# Patient Record
Sex: FEMALE | Race: WHITE | NOT HISPANIC OR LATINO | Employment: OTHER | URBAN - METROPOLITAN AREA
[De-identification: names, ages, dates, MRNs, and addresses within clinical notes are randomized per-mention and may not be internally consistent; named-entity substitution may affect disease eponyms.]

---

## 2017-01-03 ENCOUNTER — CONSULT (OUTPATIENT)
Dept: ONCOLOGY | Facility: CLINIC | Age: 57
End: 2017-01-03

## 2017-01-03 ENCOUNTER — LAB (OUTPATIENT)
Dept: LAB | Facility: HOSPITAL | Age: 57
End: 2017-01-03

## 2017-01-03 VITALS
DIASTOLIC BLOOD PRESSURE: 76 MMHG | SYSTOLIC BLOOD PRESSURE: 108 MMHG | OXYGEN SATURATION: 98 % | WEIGHT: 158.9 LBS | HEART RATE: 87 BPM | BODY MASS INDEX: 25.54 KG/M2 | TEMPERATURE: 98.6 F | RESPIRATION RATE: 14 BRPM | HEIGHT: 66 IN

## 2017-01-03 DIAGNOSIS — Z85.3 HX OF BREAST CANCER: Primary | ICD-10-CM

## 2017-01-03 DIAGNOSIS — Z85.3 HISTORY OF LEFT BREAST CANCER: Primary | ICD-10-CM

## 2017-01-03 LAB
BASOPHILS # BLD AUTO: 0.02 10*3/MM3 (ref 0–0.2)
BASOPHILS NFR BLD AUTO: 0.2 % (ref 0–2)
DEPRECATED RDW RBC AUTO: 44 FL (ref 37–54)
EOSINOPHIL # BLD AUTO: 0.02 10*3/MM3 (ref 0.1–0.3)
EOSINOPHIL NFR BLD AUTO: 0.2 % (ref 0–4)
ERYTHROCYTE [DISTWIDTH] IN BLOOD BY AUTOMATED COUNT: 12.6 % (ref 11.5–14.5)
HCT VFR BLD AUTO: 48.1 % (ref 37–47)
HGB BLD-MCNC: 16.3 G/DL (ref 12–16)
HOLD SPECIMEN: NORMAL
IMM GRANULOCYTES # BLD: 0.03 10*3/MM3 (ref 0–0.03)
IMM GRANULOCYTES NFR BLD: 0.3 % (ref 0–0.5)
LYMPHOCYTES # BLD AUTO: 1.89 10*3/MM3 (ref 0.6–4.8)
LYMPHOCYTES NFR BLD AUTO: 20.5 % (ref 20–45)
MCH RBC QN AUTO: 32 PG (ref 27–31)
MCHC RBC AUTO-ENTMCNC: 33.9 G/DL (ref 31–37)
MCV RBC AUTO: 94.5 FL (ref 81–99)
MONOCYTES # BLD AUTO: 0.4 10*3/MM3 (ref 0–1)
MONOCYTES NFR BLD AUTO: 4.3 % (ref 3–8)
NEUTROPHILS # BLD AUTO: 6.88 10*3/MM3 (ref 1.5–8.3)
NEUTROPHILS NFR BLD AUTO: 74.5 % (ref 45–70)
NRBC BLD MANUAL-RTO: 0 /100 WBC (ref 0–0)
PLATELET # BLD AUTO: 240 10*3/MM3 (ref 140–500)
PMV BLD AUTO: 10.2 FL (ref 7.4–10.4)
RBC # BLD AUTO: 5.09 10*6/MM3 (ref 4.2–5.4)
WBC NRBC COR # BLD: 9.24 10*3/MM3 (ref 4.8–10.8)
WHOLE BLOOD HOLD SPECIMEN: NORMAL
WHOLE BLOOD HOLD SPECIMEN: NORMAL

## 2017-01-03 PROCEDURE — 85025 COMPLETE CBC W/AUTO DIFF WBC: CPT | Performed by: INTERNAL MEDICINE

## 2017-01-03 PROCEDURE — 99244 OFF/OP CNSLTJ NEW/EST MOD 40: CPT | Performed by: INTERNAL MEDICINE

## 2017-01-03 PROCEDURE — 36415 COLL VENOUS BLD VENIPUNCTURE: CPT

## 2017-01-03 RX ORDER — CLONAZEPAM 2 MG/1
3 TABLET ORAL NIGHTLY
COMMUNITY

## 2017-01-03 RX ORDER — PALIPERIDONE 6 MG/1
TABLET, EXTENDED RELEASE ORAL
Refills: 0 | Status: ON HOLD | COMMUNITY
Start: 2016-12-13 | End: 2019-02-15

## 2017-01-03 RX ORDER — TOPIRAMATE 50 MG/1
50 TABLET, FILM COATED ORAL DAILY
Refills: 0 | COMMUNITY
Start: 2016-12-13 | End: 2019-02-16 | Stop reason: HOSPADM

## 2017-01-03 RX ORDER — LEVETIRACETAM 1000 MG/1
TABLET ORAL
COMMUNITY
Start: 2016-09-21 | End: 2019-02-16 | Stop reason: HOSPADM

## 2017-01-03 RX ORDER — PRAZOSIN HYDROCHLORIDE 1 MG/1
CAPSULE ORAL
Refills: 0 | Status: ON HOLD | COMMUNITY
Start: 2016-12-22 | End: 2019-02-15

## 2017-01-03 RX ORDER — VENLAFAXINE HYDROCHLORIDE 150 MG/1
CAPSULE, EXTENDED RELEASE ORAL
Refills: 0 | COMMUNITY
Start: 2016-12-13 | End: 2017-01-03

## 2017-01-03 RX ORDER — DOXYCYCLINE HYCLATE 100 MG/1
CAPSULE ORAL
Refills: 0 | COMMUNITY
Start: 2016-12-19 | End: 2018-08-27

## 2017-01-03 RX ORDER — VENLAFAXINE HYDROCHLORIDE 150 MG/1
150 CAPSULE, EXTENDED RELEASE ORAL DAILY
COMMUNITY

## 2017-01-03 RX ORDER — BUSPIRONE HYDROCHLORIDE 10 MG/1
20 TABLET ORAL 2 TIMES DAILY
Status: ON HOLD | COMMUNITY
End: 2019-02-15

## 2017-01-03 RX ORDER — FOLIC ACID 1 MG/1
1 TABLET ORAL
COMMUNITY

## 2017-01-03 RX ORDER — OMEPRAZOLE 20 MG/1
CAPSULE, DELAYED RELEASE ORAL
COMMUNITY
Start: 2012-11-26

## 2017-01-03 RX ORDER — ATORVASTATIN CALCIUM 40 MG/1
40 TABLET, FILM COATED ORAL
COMMUNITY
Start: 2014-10-07

## 2017-01-03 RX ORDER — HALOPERIDOL 5 MG/1
TABLET ORAL
Refills: 0 | COMMUNITY
Start: 2016-12-13
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
## 2017-01-03 NOTE — MR AVS SNAPSHOT
Geeta Cristobal   1/3/2017 1:20 PM   Consult    Dept Phone:  [phone]   Encounter #:  [id number]    Provider:  Lv Arboleda MD   Department:  [facility] GROUP:CONSULTANTS IN BLOOD DISORDERS AND CANCER                Your Full Care Plan              Today's Medication Changes          These changes are accurate as of: 1/3/17  1:48 PM.  If you have any questions, ask your nurse or doctor.               Medication(s)that have changed:     venlafaxine  MG 24 hr capsule   Commonly known as:  EFFEXOR-XR   Take 150 mg by mouth Daily.   What changed:  Another medication with the same name was removed. Continue taking this medication, and follow the directions you see here.   Changed by:  Lv Arboleda MD                  Your Updated Medication List          This list is accurate as of: 1/3/17  1:48 PM.  Always use your most recent med list.                AMPHETAMINE SALT COMBO PO       atorvastatin 40 MG tablet   Commonly known as:  LIPITOR       busPIRone 10 MG tablet   Commonly known as:  BUSPAR       clonazePAM 2 MG tablet   Commonly known as:  KlonoPIN       D3 ADULT PO       doxycycline 100 MG capsule   Commonly known as:  VIBRAMYCIN       folic acid 1 MG tablet   Commonly known as:  FOLVITE       haloperidol 2 MG tablet   Commonly known as:  HALDOL       levETIRAcetam 1000 MG tablet   Commonly known as:  KEPPRA       MULTI-VITAMIN tablet       omeprazole 20 MG capsule   Commonly known as:  priLOSEC       paliperidone 6 MG 24 hr tablet   Commonly known as:  INVEGA       prazosin 1 MG capsule   Commonly known as:  MINIPRESS       topiramate 50 MG tablet   Commonly known as:  TOPAMAX       venlafaxine  MG 24 hr capsule   Commonly known as:  EFFEXOR-XR               You Were Diagnosed With        Codes Comments    History of left breast cancer    -  Primary ICD-10-CM: Z85.3  ICD-9-CM: V10.3       Instructions     None    Patient Instructions History       "  Upcoming Appointments     Visit Type Date Time Department    NEW PT - ONCOLOGY 1/3/2017  1:20 PM [facility] ONC CBC [facility]    NEW PATIENT LAB 1/3/2017 12:20 PM [facility] ONC CBC LAB [facility]      MyChart Signup     [facility] "[employer] allows you to send messages to your doctor, view your test results, renew your prescriptions, schedule appointments, and more. To sign up, go to [employer] and click on the Sign Up Now link in the New User? box. Enter your "[employer] Activation Code exactly as it appears below along with the last four digits of your Social Security Number and your Date of Birth () to complete the sign-up process. If you do not sign up before the expiration date, you must request a new code.    "[employer] Activation Code: [email or web address]  Expires: 2017  1:48 PM    If you have questions, you can email [email or web address] or call [phone] to talk to our "[employer] staff. Remember, "[employer] is NOT to be used for urgent needs. For medical emergencies, dial 911.               Other Info from Your Visit           Allergies     Aleve [Naproxen Sodium]      Amoxicillin      Aspirin      Clarithromycin      Doxycycline Hyclate      Ibuprofen      Penicillins      Bruising, hives, rashes    Salicylates      Sulfa Antibiotics      Bruising, hives, rashes      Reason for Visit     Dizziness pt states she has frequent dizziness, and has not eaten today.    Breast Cancer           Vital Signs     Blood Pressure Pulse Temperature Respirations Height Weight    108/76 87 98.6 °F (37 °C) (Oral) 14 66\" (167.6 cm) 158 lb 14.4 oz (72.1 kg)    Oxygen Saturation Body Mass Index Smoking Status             98% 25.65 kg/m2 Current Every Day Smoker         Problems and Diagnoses Noted     History of left breast cancer        "
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
## 2017-02-10 ENCOUNTER — TELEPHONE (OUTPATIENT)
Dept: ONCOLOGY | Facility: HOSPITAL | Age: 57
End: 2017-02-10

## 2017-02-10 DIAGNOSIS — C50.911 MALIGNANT NEOPLASM OF RIGHT FEMALE BREAST, UNSPECIFIED SITE OF BREAST: Primary | ICD-10-CM

## 2017-02-10 NOTE — TELEPHONE ENCOUNTER
----- Message from Martina Miles sent at 2/10/2017 10:33 AM EST -----   Pt is calling to get  a diagn mammo order          [phone]      Mammogram order in already from . Verified again with him today that this is what he wanted order. Notified pt scheduling would be calling to set that up.

## 2017-02-10 NOTE — TELEPHONE ENCOUNTER
Pt said mily dhillon told her the order needed to be diagnostic mammo due to her history. Placed note on  desk to re-enter order.

## 2017-02-10 NOTE — TELEPHONE ENCOUNTER
----- Message from Prema Lentz sent at 2/10/2017 11:32 AM EST -----   Pt says she needs an order for a diagnostic mammogram.  She has screening mammogram order in the computer.       [phone]

## 2017-02-15 ENCOUNTER — HOSPITAL ENCOUNTER (OUTPATIENT)
Dept: MAMMOGRAPHY | Facility: HOSPITAL | Age: 57
Discharge: HOME | End: 2017-02-15
Attending: INTERNAL MEDICINE | Admitting: INTERNAL MEDICINE

## 2017-02-15 DIAGNOSIS — C50.911 MALIGNANT NEOPLASM OF RIGHT FEMALE BREAST, UNSPECIFIED SITE OF BREAST: ICD-10-CM

## 2017-02-15 PROCEDURE — G0279 TOMOSYNTHESIS, MAMMO: HCPCS

## 2017-02-15 PROCEDURE — G0206 DX MAMMO INCL CAD UNI: HCPCS

## 2017-03-29 ENCOUNTER — APPOINTMENT (OUTPATIENT)
Dept: CT IMAGING | Facility: HOSPITAL | Age: 57
End: 2017-03-29

## 2017-03-29 ENCOUNTER — HOSPITAL ENCOUNTER (EMERGENCY)
Facility: HOSPITAL | Age: 57
Discharge: HOME | End: 2017-03-29
Attending: EMERGENCY MEDICINE | Admitting: EMERGENCY MEDICINE

## 2017-03-29 ENCOUNTER — APPOINTMENT (OUTPATIENT)
Dept: GENERAL RADIOLOGY | Facility: HOSPITAL | Age: 57
End: 2017-03-29

## 2017-03-29 VITALS
SYSTOLIC BLOOD PRESSURE: 122 MMHG | TEMPERATURE: 98 F | DIASTOLIC BLOOD PRESSURE: 74 MMHG | HEART RATE: 88 BPM | WEIGHT: 158 LBS | BODY MASS INDEX: 25.39 KG/M2 | OXYGEN SATURATION: 99 % | HEIGHT: 66 IN | RESPIRATION RATE: 16 BRPM
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
## 2017-03-29 DIAGNOSIS — R51.9 HEADACHE, UNSPECIFIED HEADACHE TYPE: ICD-10-CM

## 2017-03-29 DIAGNOSIS — R91.1 NODULE OF LEFT LUNG: ICD-10-CM

## 2017-03-29 DIAGNOSIS — M25.512 ACUTE PAIN OF BOTH SHOULDERS: ICD-10-CM

## 2017-03-29 DIAGNOSIS — S16.1XXA CERVICAL STRAIN, ACUTE, INITIAL ENCOUNTER: Primary | ICD-10-CM

## 2017-03-29 DIAGNOSIS — M25.511 ACUTE PAIN OF BOTH SHOULDERS: ICD-10-CM

## 2017-03-29 PROCEDURE — 99283 EMERGENCY DEPT VISIT LOW MDM: CPT

## 2017-03-29 PROCEDURE — 70450 CT HEAD/BRAIN W/O DYE: CPT

## 2017-03-29 PROCEDURE — 99284 EMERGENCY DEPT VISIT MOD MDM: CPT | Performed by: EMERGENCY MEDICINE

## 2017-03-29 PROCEDURE — 71020 HC CHEST PA AND LATERAL: CPT

## 2017-03-29 PROCEDURE — 72125 CT NECK SPINE W/O DYE: CPT

## 2017-03-29 RX ORDER — HYDROCODONE BITARTRATE AND ACETAMINOPHEN 5; 325 MG/1; MG/1
TABLET ORAL
Qty: 10 TABLET | Refills: 0 | Status: SHIPPED | OUTPATIENT
Start: 2017-03-29 | End: 2018-08-27

## 2017-03-29 RX ORDER — HYDROCODONE BITARTRATE AND ACETAMINOPHEN 5; 325 MG/1; MG/1
1 TABLET ORAL ONCE
Status: COMPLETED | OUTPATIENT
Start: 2017-03-29 | End: 2017-03-29

## 2017-03-29 RX ADMIN — HYDROCODONE BITARTRATE AND ACETAMINOPHEN 1 TABLET: 5; 325 TABLET ORAL at 23:20
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
## 2017-05-19 ENCOUNTER — LAB (OUTPATIENT)
Dept: LAB | Facility: HOSPITAL | Age: 57
End: 2017-05-19
Attending: DERMATOLOGY

## 2017-05-19 ENCOUNTER — TRANSCRIBE ORDERS (OUTPATIENT)
Dept: ADMINISTRATIVE | Facility: HOSPITAL | Age: 57
End: 2017-05-19

## 2017-05-19 DIAGNOSIS — Z79.899 HIGH RISK MEDICATION USE: ICD-10-CM

## 2017-05-19 DIAGNOSIS — Z79.899 HIGH RISK MEDICATION USE: Primary | ICD-10-CM

## 2017-05-19 LAB
ALBUMIN SERPL-MCNC: 4.1 G/DL (ref 3.5–5.2)
ALBUMIN/GLOB SERPL: 1.5 G/DL
ALP SERPL-CCNC: 126 U/L (ref 40–129)
ALT SERPL W P-5'-P-CCNC: 11 U/L (ref 5–33)
ANION GAP SERPL CALCULATED.3IONS-SCNC: 12.7 MMOL/L
AST SERPL-CCNC: 14 U/L (ref 5–32)
BASOPHILS # BLD AUTO: 0.02 10*3/MM3 (ref 0–0.2)
BASOPHILS NFR BLD AUTO: 0.2 % (ref 0–2)
BILIRUB SERPL-MCNC: 0.3 MG/DL (ref 0.2–1.2)
BUN BLD-MCNC: 11 MG/DL (ref 6–20)
BUN/CREAT SERPL: 12.6 (ref 7–25)
CALCIUM SPEC-SCNC: 9.5 MG/DL (ref 8.6–10.5)
CHLORIDE SERPL-SCNC: 101 MMOL/L (ref 98–107)
CO2 SERPL-SCNC: 25.3 MMOL/L (ref 22–29)
CREAT BLD-MCNC: 0.87 MG/DL (ref 0.57–1)
DEPRECATED RDW RBC AUTO: 43.7 FL (ref 37–54)
EOSINOPHIL # BLD AUTO: 0.09 10*3/MM3 (ref 0.1–0.3)
EOSINOPHIL NFR BLD AUTO: 1.1 % (ref 0–4)
ERYTHROCYTE [DISTWIDTH] IN BLOOD BY AUTOMATED COUNT: 12.7 % (ref 11.5–14.5)
GFR SERPL CREATININE-BSD FRML MDRD: 68 ML/MIN/1.73
GLOBULIN UR ELPH-MCNC: 2.7 GM/DL
GLUCOSE BLD-MCNC: 109 MG/DL (ref 65–99)
HCT VFR BLD AUTO: 44.7 % (ref 37–47)
HCV AB SER DONR QL: NORMAL
HGB BLD-MCNC: 15.3 G/DL (ref 12–16)
IMM GRANULOCYTES # BLD: 0 10*3/MM3 (ref 0–0.03)
IMM GRANULOCYTES NFR BLD: 0 % (ref 0–0.5)
LYMPHOCYTES # BLD AUTO: 2.07 10*3/MM3 (ref 0.6–4.8)
LYMPHOCYTES NFR BLD AUTO: 25.1 % (ref 20–45)
MCH RBC QN AUTO: 31.6 PG (ref 27–31)
MCHC RBC AUTO-ENTMCNC: 34.2 G/DL (ref 31–37)
MCV RBC AUTO: 92.4 FL (ref 81–99)
MONOCYTES # BLD AUTO: 0.52 10*3/MM3 (ref 0–1)
MONOCYTES NFR BLD AUTO: 6.3 % (ref 3–8)
NEUTROPHILS # BLD AUTO: 5.54 10*3/MM3 (ref 1.5–8.3)
NEUTROPHILS NFR BLD AUTO: 67.3 % (ref 45–70)
NRBC BLD MANUAL-RTO: 0 /100 WBC (ref 0–0)
PLATELET # BLD AUTO: 210 10*3/MM3 (ref 140–500)
PMV BLD AUTO: 10.1 FL (ref 7.4–10.4)
POTASSIUM BLD-SCNC: 4.5 MMOL/L (ref 3.5–5.2)
PROT SERPL-MCNC: 6.8 G/DL (ref 6–8.5)
RBC # BLD AUTO: 4.84 10*6/MM3 (ref 4.2–5.4)
SODIUM BLD-SCNC: 139 MMOL/L (ref 136–145)
WBC NRBC COR # BLD: 8.24 10*3/MM3 (ref 4.8–10.8)

## 2017-05-19 PROCEDURE — 80053 COMPREHEN METABOLIC PANEL: CPT

## 2017-05-19 PROCEDURE — 85025 COMPLETE CBC W/AUTO DIFF WBC: CPT

## 2017-05-19 PROCEDURE — 36415 COLL VENOUS BLD VENIPUNCTURE: CPT

## 2017-05-19 PROCEDURE — 86704 HEP B CORE ANTIBODY TOTAL: CPT

## 2017-05-19 PROCEDURE — 86803 HEPATITIS C AB TEST: CPT

## 2017-05-20 LAB — HBV CORE AB SER DONR QL IA: NEGATIVE

## 2017-05-23 ENCOUNTER — LAB (OUTPATIENT)
Dept: LAB | Facility: HOSPITAL | Age: 57
End: 2017-05-23
Attending: DERMATOLOGY

## 2017-05-23 ENCOUNTER — TRANSCRIBE ORDERS (OUTPATIENT)
Dept: ADMINISTRATIVE | Facility: HOSPITAL | Age: 57
End: 2017-05-23

## 2017-05-23 DIAGNOSIS — Z79.899 HIGH RISK MEDICATION USE: ICD-10-CM

## 2017-05-23 DIAGNOSIS — Z79.899 HIGH RISK MEDICATION USE: Primary | ICD-10-CM

## 2017-05-23 PROCEDURE — 86481 TB AG RESPONSE T-CELL SUSP: CPT

## 2017-05-23 PROCEDURE — 36415 COLL VENOUS BLD VENIPUNCTURE: CPT

## 2017-05-25 LAB
TSPOT INTERPRETATION: NEGATIVE
TSPOT NIL CONTROL: 0
TSPOT PANEL A: 0
TSPOT PANEL B: 0
TSPOT POS CONTROL: 268

## 2017-11-20 ENCOUNTER — APPOINTMENT (OUTPATIENT)
Dept: GENERAL RADIOLOGY | Facility: HOSPITAL | Age: 57
End: 2017-11-20

## 2017-11-20 ENCOUNTER — HOSPITAL ENCOUNTER (EMERGENCY)
Facility: HOSPITAL | Age: 57
Discharge: HOME | End: 2017-11-20
Attending: EMERGENCY MEDICINE | Admitting: EMERGENCY MEDICINE

## 2017-11-20 VITALS
DIASTOLIC BLOOD PRESSURE: 76 MMHG | RESPIRATION RATE: 18 BRPM | HEIGHT: 66 IN | HEART RATE: 89 BPM | WEIGHT: 172.38 LBS | BODY MASS INDEX: 27.7 KG/M2 | SYSTOLIC BLOOD PRESSURE: 114 MMHG | TEMPERATURE: 98.1 F | OXYGEN SATURATION: 97 %

## 2017-11-20 DIAGNOSIS — S22.32XA CLOSED FRACTURE OF ONE RIB OF LEFT SIDE, INITIAL ENCOUNTER: ICD-10-CM
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
## 2017-11-20 LAB — TROPONIN T SERPL-MCNC: <0.01 NG/ML (ref 0–0.03)

## 2017-11-20 PROCEDURE — 93005 ELECTROCARDIOGRAM TRACING: CPT | Performed by: EMERGENCY MEDICINE

## 2017-11-20 PROCEDURE — 99284 EMERGENCY DEPT VISIT MOD MDM: CPT

## 2017-11-20 PROCEDURE — 99284 EMERGENCY DEPT VISIT MOD MDM: CPT | Performed by: PHYSICIAN ASSISTANT

## 2017-11-20 PROCEDURE — 93010 ELECTROCARDIOGRAM REPORT: CPT | Performed by: INTERNAL MEDICINE

## 2017-11-20 PROCEDURE — 84484 ASSAY OF TROPONIN QUANT: CPT | Performed by: EMERGENCY MEDICINE

## 2017-11-20 PROCEDURE — 71010 HC CHEST PA OR AP: CPT

## 2017-11-20 PROCEDURE — 71120 X-RAY EXAM BREASTBONE 2/>VWS: CPT

## 2017-11-20 RX ORDER — HYDROCODONE BITARTRATE AND ACETAMINOPHEN 5; 325 MG/1; MG/1
TABLET ORAL
Qty: 30 TABLET | Refills: 0 | Status: SHIPPED | OUTPATIENT
Start: 2017-11-20 | End: 2018-08-27

## 2017-11-20 RX ORDER — HYDROCODONE BITARTRATE AND ACETAMINOPHEN 5; 325 MG/1; MG/1
1 TABLET ORAL ONCE
Status: COMPLETED | OUTPATIENT
Start: 2017-11-20 | End: 2017-11-20

## 2017-11-20 RX ADMIN — HYDROCODONE BITARTRATE AND ACETAMINOPHEN 1 TABLET: 5; 325 TABLET ORAL at 17:30

## 2017-11-20 NOTE — DISCHARGE INSTRUCTIONS
Cough and deep breathe 2-3 times an hour while you're awake for 2 weeks.  Follow-up with Dr. Villaseñor in 1 week.  Return to emergency department if increasing pain, shortness of breath, fever, chills, productive cough, worse in any way at all.
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
## 2018-08-15 ENCOUNTER — TRANSCRIBE ORDERS (OUTPATIENT)
Dept: ADMINISTRATIVE | Facility: HOSPITAL | Age: 58
End: 2018-08-15

## 2018-08-15 DIAGNOSIS — R19.7 DIARRHEA, UNSPECIFIED TYPE: Primary | ICD-10-CM

## 2018-08-16 ENCOUNTER — LAB (OUTPATIENT)
Dept: LAB | Facility: HOSPITAL | Age: 58
End: 2018-08-16

## 2018-08-16 ENCOUNTER — TRANSCRIBE ORDERS (OUTPATIENT)
Dept: ADMINISTRATIVE | Facility: HOSPITAL | Age: 58
End: 2018-08-16

## 2018-08-16 DIAGNOSIS — R19.7 DIARRHEA, UNSPECIFIED TYPE: Primary | ICD-10-CM

## 2018-08-16 DIAGNOSIS — R19.7 DIARRHEA, UNSPECIFIED TYPE: ICD-10-CM

## 2018-08-16 LAB
ADV 40+41 DNA STL QL NAA+NON-PROBE: NOT DETECTED
ASTRO TYP 1-8 RNA STL QL NAA+NON-PROBE: NOT DETECTED
C CAYETANENSIS DNA STL QL NAA+NON-PROBE: NOT DETECTED
C DIFF GDH STL QL: NEGATIVE
CAMPY SP DNA.DIARRHEA STL QL NAA+PROBE: NOT DETECTED
CRYPTOSP STL CULT: NOT DETECTED
E COLI DNA SPEC QL NAA+PROBE: NOT DETECTED
E HISTOLYT AG STL-ACNC: NOT DETECTED
EAEC PAA PLAS AGGR+AATA ST NAA+NON-PRB: NOT DETECTED
EC STX1 + STX2 GENES STL NAA+PROBE: NOT DETECTED
EPEC EAE GENE STL QL NAA+NON-PROBE: NOT DETECTED
ETEC LTA+ST1A+ST1B TOX ST NAA+NON-PROBE: NOT DETECTED
G LAMBLIA DNA SPEC QL NAA+PROBE: NOT DETECTED
NOROVIRUS GI+II RNA STL QL NAA+NON-PROBE: NOT DETECTED
P SHIGELLOIDES DNA STL QL NAA+NON-PROBE: NOT DETECTED
RV RNA STL NAA+PROBE: NOT DETECTED
SALMONELLA DNA SPEC QL NAA+PROBE: NOT DETECTED
SAPO I+II+IV+V RNA STL QL NAA+NON-PROBE: NOT DETECTED
SHIGELLA SP+EIEC IPAH STL QL NAA+PROBE: NOT DETECTED
V CHOLERAE DNA SPEC QL NAA+PROBE: NOT DETECTED
VIBRIO DNA SPEC NAA+PROBE: NOT DETECTED
YERSINIA STL CULT: NOT DETECTED

## 2018-08-16 PROCEDURE — 87999 UNLISTED MICROBIOLOGY PX: CPT

## 2018-08-16 PROCEDURE — 87449 NOS EACH ORGANISM AG IA: CPT

## 2018-08-16 PROCEDURE — 87324 CLOSTRIDIUM AG IA: CPT

## 2018-08-17 ENCOUNTER — HOSPITAL ENCOUNTER (OUTPATIENT)
Dept: ULTRASOUND IMAGING | Facility: HOSPITAL | Age: 58
Discharge: HOME | End: 2018-08-17
Admitting: NURSE PRACTITIONER

## 2018-08-17 DIAGNOSIS — R19.7 DIARRHEA, UNSPECIFIED TYPE: ICD-10-CM

## 2018-08-17 PROCEDURE — 76700 US EXAM ABDOM COMPLETE: CPT

## 2018-08-27 ENCOUNTER — HOSPITAL ENCOUNTER (EMERGENCY)
Facility: HOSPITAL | Age: 58
Discharge: HOME | End: 2018-08-27
Attending: EMERGENCY MEDICINE | Admitting: EMERGENCY MEDICINE

## 2018-08-27 ENCOUNTER — APPOINTMENT (OUTPATIENT)
Dept: CT IMAGING | Facility: HOSPITAL | Age: 58
End: 2018-08-27

## 2018-08-27 VITALS
SYSTOLIC BLOOD PRESSURE: 123 MMHG | DIASTOLIC BLOOD PRESSURE: 84 MMHG | TEMPERATURE: 99.4 F | RESPIRATION RATE: 18 BRPM | HEART RATE: 82 BPM | BODY MASS INDEX: 28.93 KG/M2 | WEIGHT: 180 LBS | OXYGEN SATURATION: 100 % | HEIGHT: 66 IN

## 2018-08-27 DIAGNOSIS — R19.7 DIARRHEA, UNSPECIFIED TYPE: Primary | ICD-10-CM

## 2018-08-27 LAB
ALBUMIN SERPL-MCNC: 3.8 G/DL (ref 3.5–5.2)
ALBUMIN/GLOB SERPL: 1.2 G/DL
ALP SERPL-CCNC: 122 U/L (ref 40–129)
ALT SERPL W P-5'-P-CCNC: 23 U/L (ref 5–33)
ANION GAP SERPL CALCULATED.3IONS-SCNC: 15.5 MMOL/L
AST SERPL-CCNC: 19 U/L (ref 5–32)
BASOPHILS # BLD AUTO: 0.05 10*3/MM3 (ref 0–0.2)
BASOPHILS NFR BLD AUTO: 0.5 % (ref 0–2)
BILIRUB SERPL-MCNC: <0.2 MG/DL (ref 0.2–1.2)
BUN BLD-MCNC: 6 MG/DL (ref 6–20)
BUN/CREAT SERPL: 7.1 (ref 7–25)
CALCIUM SPEC-SCNC: 9.1 MG/DL (ref 8.6–10.5)
CHLORIDE SERPL-SCNC: 101 MMOL/L (ref 98–107)
CO2 SERPL-SCNC: 21.5 MMOL/L (ref 22–29)
CREAT BLD-MCNC: 0.85 MG/DL (ref 0.57–1)
DEPRECATED RDW RBC AUTO: 46.5 FL (ref 37–54)
EOSINOPHIL # BLD AUTO: 0.17 10*3/MM3 (ref 0.1–0.3)
EOSINOPHIL NFR BLD AUTO: 1.6 % (ref 0–4)
ERYTHROCYTE [DISTWIDTH] IN BLOOD BY AUTOMATED COUNT: 13.6 % (ref 11.5–14.5)
GFR SERPL CREATININE-BSD FRML MDRD: 69 ML/MIN/1.73
GLOBULIN UR ELPH-MCNC: 3.1 GM/DL
GLUCOSE BLD-MCNC: 147 MG/DL (ref 65–99)
HCT VFR BLD AUTO: 45.7 % (ref 37–47)
HGB BLD-MCNC: 15.4 G/DL (ref 12–16)
IMM GRANULOCYTES # BLD: 0.03 10*3/MM3 (ref 0–0.03)
IMM GRANULOCYTES NFR BLD: 0.3 % (ref 0–0.5)
LYMPHOCYTES # BLD AUTO: 2.52 10*3/MM3 (ref 0.6–4.8)
LYMPHOCYTES NFR BLD AUTO: 23.1 % (ref 20–45)
MAGNESIUM SERPL-MCNC: 2.2 MG/DL (ref 1.7–2.5)
MCH RBC QN AUTO: 31.2 PG (ref 27–31)
MCHC RBC AUTO-ENTMCNC: 33.7 G/DL (ref 31–37)
MCV RBC AUTO: 92.7 FL (ref 81–99)
MONOCYTES # BLD AUTO: 0.58 10*3/MM3 (ref 0–1)
MONOCYTES NFR BLD AUTO: 5.3 % (ref 3–8)
NEUTROPHILS # BLD AUTO: 7.56 10*3/MM3 (ref 1.5–8.3)
NEUTROPHILS NFR BLD AUTO: 69.2 % (ref 45–70)
NRBC BLD MANUAL-RTO: 0 /100 WBC (ref 0–0)
PLATELET # BLD AUTO: 300 10*3/MM3 (ref 140–500)
PMV BLD AUTO: 10.1 FL (ref 7.4–10.4)
POTASSIUM BLD-SCNC: 4.3 MMOL/L (ref 3.5–5.2)
PROT SERPL-MCNC: 6.9 G/DL (ref 6–8.5)
RBC # BLD AUTO: 4.93 10*6/MM3 (ref 4.2–5.4)
SODIUM BLD-SCNC: 138 MMOL/L (ref 136–145)
WBC NRBC COR # BLD: 10.91 10*3/MM3 (ref 4.8–10.8)

## 2018-08-27 PROCEDURE — 0 IOPAMIDOL PER 1 ML: Performed by: EMERGENCY MEDICINE

## 2018-08-27 PROCEDURE — 99283 EMERGENCY DEPT VISIT LOW MDM: CPT

## 2018-08-27 PROCEDURE — 96360 HYDRATION IV INFUSION INIT: CPT

## 2018-08-27 PROCEDURE — 83735 ASSAY OF MAGNESIUM: CPT | Performed by: PHYSICIAN ASSISTANT

## 2018-08-27 PROCEDURE — 0 DIATRIZOATE MEGLUMINE & SODIUM PER 1 ML: Performed by: EMERGENCY MEDICINE

## 2018-08-27 PROCEDURE — 80053 COMPREHEN METABOLIC PANEL: CPT | Performed by: PHYSICIAN ASSISTANT

## 2018-08-27 PROCEDURE — 74177 CT ABD & PELVIS W/CONTRAST: CPT

## 2018-08-27 PROCEDURE — 99284 EMERGENCY DEPT VISIT MOD MDM: CPT | Performed by: EMERGENCY MEDICINE

## 2018-08-27 PROCEDURE — 85025 COMPLETE CBC W/AUTO DIFF WBC: CPT | Performed by: PHYSICIAN ASSISTANT

## 2018-08-27 RX ORDER — SODIUM CHLORIDE 0.9 % (FLUSH) 0.9 %
10 SYRINGE (ML) INJECTION AS NEEDED
Status: DISCONTINUED | OUTPATIENT
Start: 2018-08-27 | End: 2018-08-27 | Stop reason: HOSPADM

## 2018-08-27 RX ORDER — SACCHAROMYCES BOULARDII 250 MG
250 CAPSULE ORAL 2 TIMES DAILY
Qty: 28 CAPSULE | Refills: 0 | Status: SHIPPED | OUTPATIENT
Start: 2018-08-27 | End: 2018-09-10

## 2018-08-27 RX ORDER — CIPROFLOXACIN 500 MG/1
500 TABLET, FILM COATED ORAL 2 TIMES DAILY
Status: ON HOLD | COMMUNITY
End: 2018-09-14

## 2018-08-27 RX ORDER — METRONIDAZOLE 500 MG/1
500 TABLET ORAL 3 TIMES DAILY
Status: ON HOLD | COMMUNITY
End: 2019-02-15

## 2018-08-27 RX ORDER — MONTELUKAST SODIUM 4 MG/1
1 TABLET, CHEWABLE ORAL 2 TIMES DAILY
COMMUNITY
End: 2018-10-09

## 2018-08-27 RX ADMIN — DIATRIZOATE MEGLUMINE AND DIATRIZOATE SODIUM 30 ML: 600; 100 SOLUTION ORAL; RECTAL at 14:30

## 2018-08-27 RX ADMIN — IOPAMIDOL 100 ML: 755 INJECTION, SOLUTION INTRAVENOUS at 16:20

## 2018-08-27 RX ADMIN — SODIUM CHLORIDE 1000 ML: 9 INJECTION, SOLUTION INTRAVENOUS at 14:33

## 2018-09-04 ENCOUNTER — OFFICE VISIT (OUTPATIENT)
Dept: GASTROENTEROLOGY | Facility: CLINIC | Age: 58
End: 2018-09-04

## 2018-09-04 VITALS
HEIGHT: 66 IN | DIASTOLIC BLOOD PRESSURE: 68 MMHG | WEIGHT: 178.6 LBS | BODY MASS INDEX: 28.7 KG/M2 | SYSTOLIC BLOOD PRESSURE: 128 MMHG

## 2018-09-04 DIAGNOSIS — R19.7 DIARRHEA, UNSPECIFIED TYPE: Primary | ICD-10-CM

## 2018-09-04 PROCEDURE — 99203 OFFICE O/P NEW LOW 30 MIN: CPT | Performed by: INTERNAL MEDICINE
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
## 2018-09-13 ENCOUNTER — ANESTHESIA EVENT (OUTPATIENT)
Dept: PERIOP | Facility: HOSPITAL | Age: 58
End: 2018-09-13

## 2018-09-14 ENCOUNTER — ANESTHESIA (OUTPATIENT)
Dept: PERIOP | Facility: HOSPITAL | Age: 58
End: 2018-09-14

## 2018-09-14 ENCOUNTER — HOSPITAL ENCOUNTER (OUTPATIENT)
Facility: HOSPITAL | Age: 58
Setting detail: HOSPITAL OUTPATIENT SURGERY
Discharge: HOME | End: 2018-09-14
Attending: INTERNAL MEDICINE | Admitting: INTERNAL MEDICINE

## 2018-09-14 VITALS
TEMPERATURE: 97.9 F | HEART RATE: 96 BPM | BODY MASS INDEX: 28.16 KG/M2 | RESPIRATION RATE: 18 BRPM | DIASTOLIC BLOOD PRESSURE: 82 MMHG | HEIGHT: 66 IN | SYSTOLIC BLOOD PRESSURE: 118 MMHG | WEIGHT: 175.2 LBS

## 2018-09-14 DIAGNOSIS — R19.7 DIARRHEA, UNSPECIFIED TYPE: ICD-10-CM

## 2018-09-14 PROCEDURE — 25010000002 PROPOFOL 10 MG/ML EMULSION: Performed by: NURSE ANESTHETIST, CERTIFIED REGISTERED

## 2018-09-14 PROCEDURE — 45380 COLONOSCOPY AND BIOPSY: CPT | Performed by: INTERNAL MEDICINE

## 2018-09-14 PROCEDURE — 45385 COLONOSCOPY W/LESION REMOVAL: CPT | Performed by: INTERNAL MEDICINE

## 2018-09-14 RX ORDER — SODIUM CHLORIDE, SODIUM LACTATE, POTASSIUM CHLORIDE, CALCIUM CHLORIDE 600; 310; 30; 20 MG/100ML; MG/100ML; MG/100ML; MG/100ML
9 INJECTION, SOLUTION INTRAVENOUS CONTINUOUS
Status: DISCONTINUED | OUTPATIENT
Start: 2018-09-14 | End: 2018-09-14 | Stop reason: HOSPADM

## 2018-09-14 RX ORDER — SODIUM CHLORIDE, SODIUM LACTATE, POTASSIUM CHLORIDE, CALCIUM CHLORIDE 600; 310; 30; 20 MG/100ML; MG/100ML; MG/100ML; MG/100ML
100 INJECTION, SOLUTION INTRAVENOUS CONTINUOUS
Status: DISCONTINUED | OUTPATIENT
Start: 2018-09-14 | End: 2018-09-14 | Stop reason: HOSPADM

## 2018-09-14 RX ORDER — MEPERIDINE HYDROCHLORIDE 25 MG/ML
12.5 INJECTION INTRAMUSCULAR; INTRAVENOUS; SUBCUTANEOUS
Status: DISCONTINUED | OUTPATIENT
Start: 2018-09-14 | End: 2018-09-14 | Stop reason: HOSPADM

## 2018-09-14 RX ORDER — SODIUM CHLORIDE 0.9 % (FLUSH) 0.9 %
1-10 SYRINGE (ML) INJECTION AS NEEDED
Status: DISCONTINUED | OUTPATIENT
Start: 2018-09-14 | End: 2018-09-14 | Stop reason: HOSPADM

## 2018-09-14 RX ORDER — ONDANSETRON 2 MG/ML
4 INJECTION INTRAMUSCULAR; INTRAVENOUS ONCE AS NEEDED
Status: DISCONTINUED | OUTPATIENT
Start: 2018-09-14 | End: 2018-09-14 | Stop reason: HOSPADM

## 2018-09-14 RX ORDER — LIDOCAINE HYDROCHLORIDE 20 MG/ML
INJECTION, SOLUTION INFILTRATION; PERINEURAL AS NEEDED
Status: DISCONTINUED | OUTPATIENT
Start: 2018-09-14 | End: 2018-09-14 | Stop reason: SURG

## 2018-09-14 RX ORDER — PROPOFOL 10 MG/ML
VIAL (ML) INTRAVENOUS AS NEEDED
Status: DISCONTINUED | OUTPATIENT
Start: 2018-09-14 | End: 2018-09-14 | Stop reason: SURG

## 2018-09-14 RX ORDER — LIDOCAINE HYDROCHLORIDE 10 MG/ML
0.5 INJECTION, SOLUTION EPIDURAL; INFILTRATION; INTRACAUDAL; PERINEURAL ONCE AS NEEDED
Status: COMPLETED | OUTPATIENT
Start: 2018-09-14 | End: 2018-09-14

## 2018-09-14 RX ORDER — DIPHENOXYLATE HYDROCHLORIDE AND ATROPINE SULFATE 2.5; .025 MG/1; MG/1
1 TABLET ORAL 4 TIMES DAILY PRN
Qty: 60 TABLET | Refills: 2 | Status: SHIPPED | OUTPATIENT
Start: 2018-09-14 | End: 2018-10-01

## 2018-09-14 RX ORDER — SODIUM CHLORIDE 9 MG/ML
40 INJECTION, SOLUTION INTRAVENOUS AS NEEDED
Status: DISCONTINUED | OUTPATIENT
Start: 2018-09-14 | End: 2018-09-14 | Stop reason: HOSPADM

## 2018-09-14 RX ADMIN — PROPOFOL 40 MG: 10 INJECTION, EMULSION INTRAVENOUS at 13:50

## 2018-09-14 RX ADMIN — PROPOFOL 40 MG: 10 INJECTION, EMULSION INTRAVENOUS at 14:08

## 2018-09-14 RX ADMIN — PROPOFOL 40 MG: 10 INJECTION, EMULSION INTRAVENOUS at 14:05

## 2018-09-14 RX ADMIN — PROPOFOL 40 MG: 10 INJECTION, EMULSION INTRAVENOUS at 13:46

## 2018-09-14 RX ADMIN — SODIUM CHLORIDE, POTASSIUM CHLORIDE, SODIUM LACTATE AND CALCIUM CHLORIDE: 600; 310; 30; 20 INJECTION, SOLUTION INTRAVENOUS at 12:53

## 2018-09-14 RX ADMIN — PROPOFOL 40 MG: 10 INJECTION, EMULSION INTRAVENOUS at 14:02

## 2018-09-14 RX ADMIN — SODIUM CHLORIDE, POTASSIUM CHLORIDE, SODIUM LACTATE AND CALCIUM CHLORIDE 9 ML/HR: 600; 310; 30; 20 INJECTION, SOLUTION INTRAVENOUS at 12:00

## 2018-09-14 RX ADMIN — LIDOCAINE HYDROCHLORIDE 100 MG: 20 INJECTION, SOLUTION INFILTRATION; PERINEURAL at 13:44

## 2018-09-14 RX ADMIN — LIDOCAINE HYDROCHLORIDE 0.2 ML: 10 INJECTION, SOLUTION EPIDURAL; INFILTRATION; INTRACAUDAL; PERINEURAL at 12:00

## 2018-09-14 RX ADMIN — PROPOFOL 50 MG: 10 INJECTION, EMULSION INTRAVENOUS at 13:44

## 2018-09-14 RX ADMIN — PROPOFOL 40 MG: 10 INJECTION, EMULSION INTRAVENOUS at 13:56

## 2018-09-14 RX ADMIN — PROPOFOL 40 MG: 10 INJECTION, EMULSION INTRAVENOUS at 13:59

## 2018-09-14 RX ADMIN — PROPOFOL 40 MG: 10 INJECTION, EMULSION INTRAVENOUS at 14:11

## 2018-09-14 RX ADMIN — PROPOFOL 40 MG: 10 INJECTION, EMULSION INTRAVENOUS at 14:18

## 2018-09-14 RX ADMIN — PROPOFOL 40 MG: 10 INJECTION, EMULSION INTRAVENOUS at 13:53

## 2018-09-14 RX ADMIN — PROPOFOL 40 MG: 10 INJECTION, EMULSION INTRAVENOUS at 14:15

## 2018-09-14 NOTE — ANESTHESIA PREPROCEDURE EVALUATION
Anesthesia Evaluation     Patient summary reviewed and Nursing notes reviewed   no history of anesthetic complications:  NPO Solid Status: > 8 hours  NPO Liquid Status: > 8 hours           Airway   Mallampati: II  TM distance: >3 FB  Neck ROM: full  No difficulty expected  Dental - normal exam     Pulmonary - normal exam    breath sounds clear to auscultation  (+) a smoker Current Smoked day of surgery,   Cardiovascular - normal exam    ECG reviewed  Rhythm: regular  Rate: normal        Neuro/Psych  (+) seizures (last 3 months ago) poorly controlled, psychiatric history Anxiety and Depression, poor historian.,     GI/Hepatic/Renal/Endo    (+)  GERD poorly controlled,      Musculoskeletal     Abdominal  - normal exam   Substance History - negative use     OB/GYN          Other      history of cancer remission                    Anesthesia Plan    ASA 2     MAC     intravenous induction   Anesthetic plan, all risks, benefits, and alternatives have been provided, discussed and informed consent has been obtained with: patient.  Use of blood products discussed with patient  Consented to blood products.
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
## 2018-09-14 NOTE — OP NOTE
Colonoscopy Note:    Indication:  Abdominal pain diarrhea    Consent: Procedure colonoscopy was explained to the patient and detail including but not limited to the, patient of bleeding perforation and possible reactions to sedation.    Sedation: Sedation was provided by anesthesia.    Procedure:  After excellent sedation a digital rectal examination is performed and a flexible colonoscope was inserted into the rectum passed to the cecum.  The cecum was identified by both the ileocecal valve and the appendiceal orifice.  The overall bowel preparation was fair.  She had some looping and redundancy in the colon such that external pressure was applied to successfully traversed into the cecum.  Upon withdrawal scope careful examination mucosa was made.  Pertinent findings include a large lipomatous lesion in the hepatic flexure area this is biopsied with forceps..  Random colon biopsies are obtained with forceps.  The scope was slowly withdrawn to the rectal area were 2 colon polyps were noted that ranged in size between 4-5 mm removed completely using snare cautery.  The scope was slowly withdrawn to the rectum and retroflex were internal hemorrhoids are noted.  Scope was straightened and withdrawn out of the patient with no immediate, patient's and she tolerated procedure well.    Impression/Plan:  Colon polyps removed using snare cautery  Random biopsies obtained  Lipoma noted.  She'll see me back in the office in about 2-3 weeks to discuss biopsy results.

## 2018-09-14 NOTE — ANESTHESIA POSTPROCEDURE EVALUATION
Patient: Geeta Cristobal    Procedure Summary     Date:  09/14/18 Room / Location:  [facility] ENDOSCOPY 2 /  [facility]    Anesthesia Start:  1341 Anesthesia Stop:  1426    Procedure:  COLONOSCOPY with biopsies with polypectomy (N/A ) Diagnosis:       Diarrhea, unspecified type      (Diarrhea, unspecified type [R19.7])    Surgeon:  Elly Erazo MD Provider:  Mitra Messer CRNA    Anesthesia Type:  MAC ASA Status:  2          Anesthesia Type: MAC  Last vitals  BP   118/82 (09/14/18 1500)   Temp   97.9 °F (36.6 °C) (09/14/18 1430)   Pulse   96 (09/14/18 1500)   Resp   18 (09/14/18 1500)     SpO2         Post Anesthesia Care and Evaluation    Patient location during evaluation: bedside  Patient participation: complete - patient participated  Level of consciousness: awake and alert  Pain score: 0  Pain management: adequate  Airway patency: patent  Anesthetic complications: No anesthetic complications  PONV Status: none  Cardiovascular status: acceptable  Respiratory status: acceptable  Hydration status: acceptable

## 2018-09-18 LAB
LAB AP CASE REPORT: NORMAL
PATH REPORT.FINAL DX SPEC: NORMAL

## 2018-09-19 RX ORDER — BUDESONIDE 3 MG/1
9 CAPSULE, COATED PELLETS ORAL DAILY
Qty: 90 CAPSULE | Refills: 3 | Status: SHIPPED | OUTPATIENT
Start: 2018-09-19 | End: 2018-12-18

## 2018-09-19 NOTE — PROGRESS NOTES
"Can you tell patient her biopsies show \"microscopic colitis\". I called in entocort tid, she has lomotil. She can use lomotil with entocort. I will see her back in 3 weeks"

## 2018-09-24 ENCOUNTER — TELEPHONE (OUTPATIENT)
Dept: SURGERY | Facility: CLINIC | Age: 58
End: 2018-09-24

## 2018-09-27 ENCOUNTER — TELEPHONE (OUTPATIENT)
Dept: SURGERY | Facility: CLINIC | Age: 58
End: 2018-09-27

## 2018-10-09 ENCOUNTER — OFFICE VISIT (OUTPATIENT)
Dept: GASTROENTEROLOGY | Facility: CLINIC | Age: 58
End: 2018-10-09

## 2018-10-09 VITALS
HEART RATE: 89 BPM | DIASTOLIC BLOOD PRESSURE: 62 MMHG | OXYGEN SATURATION: 98 % | BODY MASS INDEX: 28.54 KG/M2 | SYSTOLIC BLOOD PRESSURE: 118 MMHG | WEIGHT: 177.6 LBS | HEIGHT: 66 IN

## 2018-10-09 DIAGNOSIS — K52.832 LYMPHOCYTIC COLITIS: ICD-10-CM

## 2018-10-09 DIAGNOSIS — R19.7 DIARRHEA, UNSPECIFIED TYPE: Primary | ICD-10-CM

## 2018-10-09 PROCEDURE — 99213 OFFICE O/P EST LOW 20 MIN: CPT | Performed by: INTERNAL MEDICINE

## 2018-10-09 RX ORDER — CHOLESTYRAMINE 4 G/9G
4 POWDER, FOR SUSPENSION ORAL 2 TIMES DAILY WITH MEALS
Qty: 378 G | Refills: 3 | Status: SHIPPED | OUTPATIENT
Start: 2018-10-09

## 2018-10-09 RX ORDER — BISMUTH SUBSALICYLATE 262 MG/1
524 TABLET, CHEWABLE ORAL
Qty: 120 TABLET | Refills: 1 | Status: SHIPPED | OUTPATIENT
Start: 2018-10-09 | End: 2019-02-16 | Stop reason: HOSPADM
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
## 2018-11-20 ENCOUNTER — TRANSCRIBE ORDERS (OUTPATIENT)
Dept: ADMINISTRATIVE | Facility: HOSPITAL | Age: 58
End: 2018-11-20

## 2018-11-20 ENCOUNTER — HOSPITAL ENCOUNTER (OUTPATIENT)
Dept: GENERAL RADIOLOGY | Facility: HOSPITAL | Age: 58
Discharge: HOME | End: 2018-11-20
Attending: INTERNAL MEDICINE | Admitting: INTERNAL MEDICINE

## 2018-11-20 DIAGNOSIS — R52 PAIN: ICD-10-CM

## 2018-11-20 DIAGNOSIS — R52 PAIN: Primary | ICD-10-CM

## 2018-11-20 PROCEDURE — 73030 X-RAY EXAM OF SHOULDER: CPT

## 2019-01-02 ENCOUNTER — OFFICE VISIT (OUTPATIENT)
Dept: GASTROENTEROLOGY | Facility: CLINIC | Age: 59
End: 2019-01-02

## 2019-01-02 VITALS
SYSTOLIC BLOOD PRESSURE: 124 MMHG | BODY MASS INDEX: 26.55 KG/M2 | DIASTOLIC BLOOD PRESSURE: 68 MMHG | WEIGHT: 165.2 LBS | HEIGHT: 66 IN

## 2019-01-02 DIAGNOSIS — R10.84 GENERALIZED ABDOMINAL PAIN: Primary | ICD-10-CM

## 2019-01-02 DIAGNOSIS — R11.0 NAUSEA: ICD-10-CM

## 2019-01-02 DIAGNOSIS — R19.7 DIARRHEA, UNSPECIFIED TYPE: ICD-10-CM

## 2019-01-02 PROCEDURE — 99214 OFFICE O/P EST MOD 30 MIN: CPT | Performed by: INTERNAL MEDICINE

## 2019-01-02 RX ORDER — SUCRALFATE ORAL 1 G/10ML
1 SUSPENSION ORAL 4 TIMES DAILY
Qty: 1200 ML | Refills: 3 | Status: SHIPPED | OUTPATIENT
Start: 2019-01-02 | End: 2019-02-01
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
## 2019-01-08 ENCOUNTER — HOSPITAL ENCOUNTER (OUTPATIENT)
Dept: CT IMAGING | Facility: HOSPITAL | Age: 59
Discharge: HOME | End: 2019-01-08
Attending: INTERNAL MEDICINE | Admitting: INTERNAL MEDICINE

## 2019-01-08 ENCOUNTER — ANESTHESIA EVENT (OUTPATIENT)
Dept: PERIOP | Facility: HOSPITAL | Age: 59
End: 2019-01-08

## 2019-01-08 DIAGNOSIS — R10.84 GENERALIZED ABDOMINAL PAIN: ICD-10-CM

## 2019-01-08 DIAGNOSIS — R19.7 DIARRHEA, UNSPECIFIED TYPE: ICD-10-CM

## 2019-01-08 DIAGNOSIS — R11.0 NAUSEA: ICD-10-CM

## 2019-01-08 PROCEDURE — 74177 CT ABD & PELVIS W/CONTRAST: CPT

## 2019-01-08 PROCEDURE — 0 DIATRIZOATE MEGLUMINE & SODIUM PER 1 ML: Performed by: INTERNAL MEDICINE

## 2019-01-08 PROCEDURE — 0 IOPAMIDOL PER 1 ML: Performed by: INTERNAL MEDICINE

## 2019-01-08 RX ADMIN — DIATRIZOATE MEGLUMINE AND DIATRIZOATE SODIUM 30 ML: 600; 100 SOLUTION ORAL; RECTAL at 08:52

## 2019-01-08 RX ADMIN — IOPAMIDOL 100 ML: 755 INJECTION, SOLUTION INTRAVENOUS at 10:05

## 2019-01-09 ENCOUNTER — ANESTHESIA (OUTPATIENT)
Dept: PERIOP | Facility: HOSPITAL | Age: 59
End: 2019-01-09

## 2019-01-09 ENCOUNTER — HOSPITAL ENCOUNTER (OUTPATIENT)
Facility: HOSPITAL | Age: 59
Setting detail: HOSPITAL OUTPATIENT SURGERY
Discharge: HOME | End: 2019-01-09
Attending: INTERNAL MEDICINE | Admitting: INTERNAL MEDICINE

## 2019-01-09 VITALS
DIASTOLIC BLOOD PRESSURE: 99 MMHG | TEMPERATURE: 98.4 F | HEART RATE: 65 BPM | RESPIRATION RATE: 15 BRPM | BODY MASS INDEX: 26.45 KG/M2 | WEIGHT: 163.8 LBS | SYSTOLIC BLOOD PRESSURE: 104 MMHG | OXYGEN SATURATION: 98 %

## 2019-01-09 DIAGNOSIS — R10.84 GENERALIZED ABDOMINAL PAIN: ICD-10-CM

## 2019-01-09 DIAGNOSIS — R19.7 DIARRHEA, UNSPECIFIED TYPE: ICD-10-CM

## 2019-01-09 DIAGNOSIS — R11.0 NAUSEA: ICD-10-CM

## 2019-01-09 PROCEDURE — 43239 EGD BIOPSY SINGLE/MULTIPLE: CPT | Performed by: INTERNAL MEDICINE

## 2019-01-09 PROCEDURE — 88305 TISSUE EXAM BY PATHOLOGIST: CPT | Performed by: INTERNAL MEDICINE

## 2019-01-09 PROCEDURE — 25010000002 PROPOFOL 10 MG/ML EMULSION: Performed by: NURSE ANESTHETIST, CERTIFIED REGISTERED

## 2019-01-09 RX ORDER — SODIUM CHLORIDE, SODIUM LACTATE, POTASSIUM CHLORIDE, CALCIUM CHLORIDE 600; 310; 30; 20 MG/100ML; MG/100ML; MG/100ML; MG/100ML
100 INJECTION, SOLUTION INTRAVENOUS CONTINUOUS
Status: CANCELLED | OUTPATIENT
Start: 2019-01-09

## 2019-01-09 RX ORDER — ONDANSETRON 2 MG/ML
4 INJECTION INTRAMUSCULAR; INTRAVENOUS ONCE AS NEEDED
Status: CANCELLED | OUTPATIENT
Start: 2019-01-09

## 2019-01-09 RX ORDER — SODIUM CHLORIDE, SODIUM LACTATE, POTASSIUM CHLORIDE, CALCIUM CHLORIDE 600; 310; 30; 20 MG/100ML; MG/100ML; MG/100ML; MG/100ML
9 INJECTION, SOLUTION INTRAVENOUS CONTINUOUS
Status: DISCONTINUED | OUTPATIENT
Start: 2019-01-09 | End: 2019-01-09 | Stop reason: HOSPADM

## 2019-01-09 RX ORDER — PROPOFOL 10 MG/ML
VIAL (ML) INTRAVENOUS AS NEEDED
Status: DISCONTINUED | OUTPATIENT
Start: 2019-01-09 | End: 2019-01-09 | Stop reason: SURG

## 2019-01-09 RX ORDER — SODIUM CHLORIDE 0.9 % (FLUSH) 0.9 %
1-10 SYRINGE (ML) INJECTION AS NEEDED
Status: DISCONTINUED | OUTPATIENT
Start: 2019-01-09 | End: 2019-01-09 | Stop reason: HOSPADM

## 2019-01-09 RX ORDER — LIDOCAINE HYDROCHLORIDE 20 MG/ML
INJECTION, SOLUTION INFILTRATION; PERINEURAL AS NEEDED
Status: DISCONTINUED | OUTPATIENT
Start: 2019-01-09 | End: 2019-01-09 | Stop reason: SURG

## 2019-01-09 RX ORDER — SODIUM CHLORIDE 0.9 % (FLUSH) 0.9 %
3 SYRINGE (ML) INJECTION EVERY 12 HOURS SCHEDULED
Status: DISCONTINUED | OUTPATIENT
Start: 2019-01-09 | End: 2019-01-09 | Stop reason: HOSPADM

## 2019-01-09 RX ORDER — LIDOCAINE HYDROCHLORIDE 10 MG/ML
0.5 INJECTION, SOLUTION EPIDURAL; INFILTRATION; INTRACAUDAL; PERINEURAL ONCE AS NEEDED
Status: DISCONTINUED | OUTPATIENT
Start: 2019-01-09 | End: 2019-01-09 | Stop reason: HOSPADM

## 2019-01-09 RX ORDER — SODIUM CHLORIDE 9 MG/ML
40 INJECTION, SOLUTION INTRAVENOUS AS NEEDED
Status: DISCONTINUED | OUTPATIENT
Start: 2019-01-09 | End: 2019-01-09 | Stop reason: HOSPADM

## 2019-01-09 RX ADMIN — SODIUM CHLORIDE, POTASSIUM CHLORIDE, SODIUM LACTATE AND CALCIUM CHLORIDE: 600; 310; 30; 20 INJECTION, SOLUTION INTRAVENOUS at 10:31

## 2019-01-09 RX ADMIN — PROPOFOL 50 MG: 10 INJECTION, EMULSION INTRAVENOUS at 10:34

## 2019-01-09 RX ADMIN — PROPOFOL 50 MG: 10 INJECTION, EMULSION INTRAVENOUS at 10:36

## 2019-01-09 RX ADMIN — LIDOCAINE HYDROCHLORIDE 100 MG: 20 INJECTION, SOLUTION INFILTRATION; PERINEURAL at 10:32
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
## 2019-01-09 NOTE — ANESTHESIA POSTPROCEDURE EVALUATION
Patient: Geeta Cristobal    Procedure Summary     Date:  01/09/19 Room / Location:  [facility] ENDOSCOPY 2 /  [facility] OR    Anesthesia Start:  1031 Anesthesia Stop:  1052    Procedure:  ESOPHAGOGASTRODUODENOSCOPY WITH BIOPSIES (N/A Esophagus) Diagnosis:       Generalized abdominal pain      Diarrhea, unspecified type      Nausea      (Generalized abdominal pain [R10.84])      (Diarrhea, unspecified type [R19.7])      (Nausea [R11.0])    Surgeon:  Elly Erazo MD Provider:  Naomi Lou CRNA    Anesthesia Type:  MAC ASA Status:  2          Anesthesia Type: MAC  Last vitals  BP   104/99 (01/09/19 1111)   Temp   98.4 °F (36.9 °C) (01/09/19 1014)   Pulse   65 (01/09/19 1111)   Resp   15 (01/09/19 1111)     SpO2   98 % (01/09/19 1111)     Post Anesthesia Care and Evaluation    Patient location during evaluation: PHASE II  Patient participation: complete - patient participated  Level of consciousness: awake and alert  Pain score: 2 (No change in abdominal pain from pre-op)  Pain management: satisfactory to patient  Airway patency: patent  Anesthetic complications: No anesthetic complications  PONV Status: none  Cardiovascular status: acceptable  Respiratory status: acceptable  Hydration status: acceptable    Comments: Patient seen and evaluated prior to discharge.

## 2019-01-09 NOTE — OP NOTE
Patient Name:  Geeta Cristobal  YOB: 1960    Date of Procedure:  1/9/2019    Procedure Performed:  EGD     Indications:  Abdominal pain    Pre-op Diagnosis:   Generalized abdominal pain [R10.84]  Diarrhea, unspecified type [R19.7]  Nausea [R11.0]    Post-Op Diagnosis Codes:     * Generalized abdominal pain [R10.84]     * Diarrhea, unspecified type [R19.7]     * Nausea [R11.0]         Staff:  Surgeon(s):  Elly Erazo MD         Consent: Procedure of EGD Indications, risks and procedure were discussed with the patient, including but not limited to, bleeding, infection, possibility of perforation and possible polypectomy. All of the patient's questions were answered, and signed informed consent was obtained and placed on the chart.      Sedation: Sedation was provided by anesthesia.      Estimated Blood Loss: minimal    Specimens:   ID Type Source Tests Collected by Time   A : bx Tissue Small Intestine TISSUE PATHOLOGY EXAM Elly Erazo MD 1/9/2019 1041   B :  Tissue Stomach TISSUE PATHOLOGY EXAM Elly Erazo MD 1/9/2019 1042         Description of Procedure: After excellent sedation a flexible endoscope was passed into the oropharynx into the distal esophagus.  Z line was regular and the scope disease was traversed into the stomach although into the antrum.  She does have a medium-sized hiatal hernia that is noted.  The scope was traversed into the antrum.  Gastritis is noted extending from the body to the antrum multiple biopsies are obtained.  No ulcers are noted here.  The scope was retroflexed here straightened and passed into the duodenal bulb although into the second portion with ease.  The entire examined small bowel mucosa overall appeared normal.  Small bowel biopsies are obtained with forceps.  Scope was then slowly withdrawn out of the patient with no immediate, patient's and she tolerated procedure well.   Findings:   Gastritis  Hiatal hernia  We'll await biopsy results.  We'll  proceed with gallbladder evaluation.    Complications:  none        Elly Erazo MD     Date: 1/9/2019  Time: 10:52 AM

## 2019-01-10 LAB
CYTO UR: NORMAL
LAB AP CASE REPORT: NORMAL
PATH REPORT.FINAL DX SPEC: NORMAL
PATH REPORT.GROSS SPEC: NORMAL

## 2019-01-16 ENCOUNTER — HOSPITAL ENCOUNTER (OUTPATIENT)
Dept: NUCLEAR MEDICINE | Facility: HOSPITAL | Age: 59
Discharge: HOME | End: 2019-01-16
Attending: INTERNAL MEDICINE

## 2019-01-16 DIAGNOSIS — R11.0 NAUSEA: ICD-10-CM

## 2019-01-16 DIAGNOSIS — R19.7 DIARRHEA, UNSPECIFIED TYPE: ICD-10-CM

## 2019-01-16 DIAGNOSIS — R10.84 GENERALIZED ABDOMINAL PAIN: ICD-10-CM

## 2019-01-16 PROCEDURE — 25010000002 SINCALIDE PER 5 MCG: Performed by: INTERNAL MEDICINE

## 2019-01-16 PROCEDURE — 78227 HEPATOBIL SYST IMAGE W/DRUG: CPT

## 2019-01-16 PROCEDURE — 0 TECHNETIUM TC 99M MEBROFENIN KIT: Performed by: INTERNAL MEDICINE

## 2019-01-16 PROCEDURE — A9537 TC99M MEBROFENIN: HCPCS | Performed by: INTERNAL MEDICINE

## 2019-01-16 RX ORDER — KIT FOR THE PREPARATION OF TECHNETIUM TC 99M MEBROFENIN 45 MG/10ML
1 INJECTION, POWDER, LYOPHILIZED, FOR SOLUTION INTRAVENOUS
Status: COMPLETED | OUTPATIENT
Start: 2019-01-16 | End: 2019-01-16

## 2019-01-16 RX ADMIN — MEBROFENIN 1 DOSE: 45 INJECTION, POWDER, LYOPHILIZED, FOR SOLUTION INTRAVENOUS at 11:30

## 2019-01-16 RX ADMIN — SINCALIDE 1.5 MCG: 5 INJECTION, POWDER, LYOPHILIZED, FOR SOLUTION INTRAVENOUS at 12:09

## 2019-01-18 DIAGNOSIS — R10.9 ABDOMINAL PAIN, UNSPECIFIED ABDOMINAL LOCATION: Primary | ICD-10-CM

## 2019-02-15 ENCOUNTER — APPOINTMENT (OUTPATIENT)
Dept: CT IMAGING | Facility: HOSPITAL | Age: 59
End: 2019-02-15

## 2019-02-15 ENCOUNTER — HOSPITAL ENCOUNTER (OUTPATIENT)
Facility: HOSPITAL | Age: 59
Setting detail: OBSERVATION
Discharge: HOME | End: 2019-02-16
Attending: EMERGENCY MEDICINE | Admitting: HOSPITALIST

## 2019-02-15 DIAGNOSIS — G93.40 ENCEPHALOPATHY ACUTE: Primary | ICD-10-CM

## 2019-02-15 DIAGNOSIS — R56.9 SEIZURE (HCC): ICD-10-CM

## 2019-02-15 LAB
ALBUMIN SERPL-MCNC: 3.7 G/DL (ref 3.5–5.2)
ALBUMIN/GLOB SERPL: 1.2 G/DL
ALP SERPL-CCNC: 125 U/L (ref 40–129)
ALT SERPL W P-5'-P-CCNC: 15 U/L (ref 5–33)
AMPHET+METHAMPHET UR QL: NEGATIVE
AMPHETAMINES UR QL: NEGATIVE
ANION GAP SERPL CALCULATED.3IONS-SCNC: 8.7 MMOL/L
ARTERIAL PATENCY WRIST A: ABNORMAL
AST SERPL-CCNC: 17 U/L (ref 5–32)
ATMOSPHERIC PRESS: 736 MMHG
BARBITURATES UR QL SCN: NEGATIVE
BASE EXCESS BLDA CALC-SCNC: -0.9 MMOL/L (ref 0–2)
BASOPHILS # BLD AUTO: 0.03 10*3/MM3 (ref 0–0.2)
BASOPHILS NFR BLD AUTO: 0.4 % (ref 0–1.5)
BDY SITE: ABNORMAL
BENZODIAZ UR QL SCN: NEGATIVE
BILIRUB SERPL-MCNC: 0.2 MG/DL (ref 0.2–1.2)
BODY TEMPERATURE: 37 C
BUN BLD-MCNC: 8 MG/DL (ref 6–20)
BUN/CREAT SERPL: 11.3 (ref 7–25)
BUPRENORPHINE SERPL-MCNC: NEGATIVE NG/ML
CALCIUM SPEC-SCNC: 9.1 MG/DL (ref 8.6–10.5)
CANNABINOIDS SERPL QL: NEGATIVE
CHLORIDE SERPL-SCNC: 106 MMOL/L (ref 98–107)
CO2 SERPL-SCNC: 26.3 MMOL/L (ref 22–29)
COCAINE UR QL: NEGATIVE
CREAT BLD-MCNC: 0.71 MG/DL (ref 0.57–1)
DEPRECATED RDW RBC AUTO: 52.3 FL (ref 37–54)
EOSINOPHIL # BLD AUTO: 0.16 10*3/MM3 (ref 0–0.4)
EOSINOPHIL NFR BLD AUTO: 2.2 % (ref 0.3–6.2)
ERYTHROCYTE [DISTWIDTH] IN BLOOD BY AUTOMATED COUNT: 14.9 % (ref 12.3–15.4)
GFR SERPL CREATININE-BSD FRML MDRD: 85 ML/MIN/1.73
GLOBULIN UR ELPH-MCNC: 3.1 GM/DL
GLUCOSE BLD-MCNC: 84 MG/DL (ref 65–99)
GLUCOSE BLDC GLUCOMTR-MCNC: 81 MG/DL (ref 70–130)
HCO3 BLDA-SCNC: 24.8 MMOL/L (ref 20–26)
HCT VFR BLD AUTO: 42.9 % (ref 34–46.6)
HGB BLD-MCNC: 14.7 G/DL (ref 12–15.9)
HGB BLDA-MCNC: 13.7 G/DL (ref 13.5–17.5)
IMM GRANULOCYTES # BLD AUTO: 0.01 10*3/MM3 (ref 0–0.05)
IMM GRANULOCYTES NFR BLD AUTO: 0.1 % (ref 0–0.5)
LYMPHOCYTES # BLD AUTO: 2.8 10*3/MM3 (ref 0.7–3.1)
LYMPHOCYTES NFR BLD AUTO: 38.2 % (ref 19.6–45.3)
MAGNESIUM SERPL-MCNC: 2.2 MG/DL (ref 1.7–2.5)
MCH RBC QN AUTO: 32 PG (ref 26.6–33)
MCHC RBC AUTO-ENTMCNC: 34.3 G/DL (ref 31.5–35.7)
MCV RBC AUTO: 93.3 FL (ref 79–97)
METHADONE UR QL SCN: NEGATIVE
MODALITY: ABNORMAL
MONOCYTES # BLD AUTO: 0.57 10*3/MM3 (ref 0.1–0.9)
MONOCYTES NFR BLD AUTO: 7.8 % (ref 5–12)
NEUTROPHILS # BLD AUTO: 3.76 10*3/MM3 (ref 1.4–7)
NEUTROPHILS NFR BLD AUTO: 51.3 % (ref 42.7–76)
OPIATES UR QL: NEGATIVE
OXYCODONE UR QL SCN: NEGATIVE
PCO2 BLDA: 43.8 MM HG (ref 35–45)
PCO2 TEMP ADJ BLD: 43.8 MM HG (ref 35–45)
PCP UR QL SCN: NEGATIVE
PH BLDA: 7.36 PH UNITS (ref 7.35–7.45)
PH, TEMP CORRECTED: 7.36 PH UNITS (ref 7.35–7.45)
PLATELET # BLD AUTO: 256 10*3/MM3 (ref 140–450)
PMV BLD AUTO: 10.4 FL (ref 6–12)
PO2 BLDA: 75.3 MM HG (ref 83–108)
PO2 TEMP ADJ BLD: 75.3 MM HG (ref 83–108)
POTASSIUM BLD-SCNC: 4.4 MMOL/L (ref 3.5–5.2)
PROPOXYPH UR QL: NEGATIVE
PROT SERPL-MCNC: 6.8 G/DL (ref 6–8.5)
RBC # BLD AUTO: 4.6 10*6/MM3 (ref 3.77–5.28)
SAO2 % BLDCOA: 96.8 % (ref 94–99)
SODIUM BLD-SCNC: 141 MMOL/L (ref 136–145)
TRICYCLICS UR QL SCN: NEGATIVE
TSH SERPL DL<=0.05 MIU/L-ACNC: 0.76 MIU/ML (ref 0.27–4.2)
VENTILATOR MODE: ABNORMAL
WBC NRBC COR # BLD: 7.33 10*3/MM3 (ref 3.4–10.8)

## 2019-02-15 PROCEDURE — 36600 WITHDRAWAL OF ARTERIAL BLOOD: CPT

## 2019-02-15 PROCEDURE — 82803 BLOOD GASES ANY COMBINATION: CPT

## 2019-02-15 PROCEDURE — 82962 GLUCOSE BLOOD TEST: CPT

## 2019-02-15 PROCEDURE — 85025 COMPLETE CBC W/AUTO DIFF WBC: CPT | Performed by: PHYSICIAN ASSISTANT

## 2019-02-15 PROCEDURE — 25010000002 LEVETRIRACETAM PER 10 MG

## 2019-02-15 PROCEDURE — 96361 HYDRATE IV INFUSION ADD-ON: CPT

## 2019-02-15 PROCEDURE — 99285 EMERGENCY DEPT VISIT HI MDM: CPT | Performed by: PHYSICIAN ASSISTANT

## 2019-02-15 PROCEDURE — G0378 HOSPITAL OBSERVATION PER HR: HCPCS

## 2019-02-15 PROCEDURE — 99285 EMERGENCY DEPT VISIT HI MDM: CPT

## 2019-02-15 PROCEDURE — 80306 DRUG TEST PRSMV INSTRMNT: CPT | Performed by: HOSPITALIST

## 2019-02-15 PROCEDURE — 80053 COMPREHEN METABOLIC PANEL: CPT | Performed by: PHYSICIAN ASSISTANT

## 2019-02-15 PROCEDURE — 96368 THER/DIAG CONCURRENT INF: CPT

## 2019-02-15 PROCEDURE — 70450 CT HEAD/BRAIN W/O DYE: CPT

## 2019-02-15 PROCEDURE — 84443 ASSAY THYROID STIM HORMONE: CPT | Performed by: HOSPITALIST

## 2019-02-15 PROCEDURE — 96375 TX/PRO/DX INJ NEW DRUG ADDON: CPT

## 2019-02-15 PROCEDURE — 96366 THER/PROPH/DIAG IV INF ADDON: CPT

## 2019-02-15 PROCEDURE — 25810000003 SODIUM CHLORIDE 0.9 % WITH KCL 20 MEQ 20-0.9 MEQ/L-% SOLUTION

## 2019-02-15 PROCEDURE — 84145 PROCALCITONIN (PCT): CPT | Performed by: INTERNAL MEDICINE

## 2019-02-15 PROCEDURE — 96360 HYDRATION IV INFUSION INIT: CPT

## 2019-02-15 PROCEDURE — 83735 ASSAY OF MAGNESIUM: CPT | Performed by: HOSPITALIST

## 2019-02-15 PROCEDURE — 99219 PR INITIAL OBSERVATION CARE/DAY 50 MINUTES: CPT | Performed by: HOSPITALIST

## 2019-02-15 PROCEDURE — 80177 DRUG SCRN QUAN LEVETIRACETAM: CPT | Performed by: HOSPITALIST

## 2019-02-15 PROCEDURE — 25010000002 LEVOFLOXACIN PER 250 MG

## 2019-02-15 PROCEDURE — 96365 THER/PROPH/DIAG IV INF INIT: CPT

## 2019-02-15 RX ORDER — SODIUM CHLORIDE 0.9 % (FLUSH) 0.9 %
3 SYRINGE (ML) INJECTION EVERY 12 HOURS SCHEDULED
Status: DISCONTINUED | OUTPATIENT
Start: 2019-02-15 | End: 2019-02-16 | Stop reason: HOSPADM

## 2019-02-15 RX ORDER — ACETAMINOPHEN 325 MG/1
650 TABLET ORAL EVERY 4 HOURS PRN
Status: DISCONTINUED | OUTPATIENT
Start: 2019-02-15 | End: 2019-02-16 | Stop reason: HOSPADM

## 2019-02-15 RX ORDER — CHOLECALCIFEROL (VITAMIN D3) 125 MCG
10 CAPSULE ORAL NIGHTLY
Status: DISCONTINUED | OUTPATIENT
Start: 2019-02-15 | End: 2019-02-16 | Stop reason: HOSPADM

## 2019-02-15 RX ORDER — LEVETIRACETAM 500 MG/5ML
INJECTION, SOLUTION, CONCENTRATE INTRAVENOUS
Status: COMPLETED
Start: 2019-02-15 | End: 2019-02-15

## 2019-02-15 RX ORDER — SENNA AND DOCUSATE SODIUM 50; 8.6 MG/1; MG/1
2 TABLET, FILM COATED ORAL NIGHTLY PRN
Status: DISCONTINUED | OUTPATIENT
Start: 2019-02-15 | End: 2019-02-16 | Stop reason: HOSPADM

## 2019-02-15 RX ORDER — LORAZEPAM 2 MG/ML
1 INJECTION INTRAMUSCULAR EVERY 6 HOURS PRN
Status: DISCONTINUED | OUTPATIENT
Start: 2019-02-15 | End: 2019-02-16 | Stop reason: HOSPADM

## 2019-02-15 RX ORDER — LEVOFLOXACIN 5 MG/ML
INJECTION, SOLUTION INTRAVENOUS
Status: COMPLETED
Start: 2019-02-15 | End: 2019-02-16

## 2019-02-15 RX ORDER — LEVETIRACETAM 5 MG/ML
500 INJECTION INTRAVASCULAR EVERY 12 HOURS SCHEDULED
Status: DISCONTINUED | OUTPATIENT
Start: 2019-02-15 | End: 2019-02-16

## 2019-02-15 RX ORDER — ACETAMINOPHEN 500 MG
500 TABLET ORAL EVERY 6 HOURS PRN
COMMUNITY
End: 2019-09-26

## 2019-02-15 RX ORDER — SODIUM CHLORIDE 0.9 % (FLUSH) 0.9 %
3-10 SYRINGE (ML) INJECTION AS NEEDED
Status: DISCONTINUED | OUTPATIENT
Start: 2019-02-15 | End: 2019-02-16 | Stop reason: HOSPADM

## 2019-02-15 RX ORDER — ONDANSETRON 2 MG/ML
4 INJECTION INTRAMUSCULAR; INTRAVENOUS EVERY 6 HOURS PRN
Status: DISCONTINUED | OUTPATIENT
Start: 2019-02-15 | End: 2019-02-16 | Stop reason: HOSPADM

## 2019-02-15 RX ORDER — SODIUM CHLORIDE AND POTASSIUM CHLORIDE 150; 900 MG/100ML; MG/100ML
INJECTION, SOLUTION INTRAVENOUS
Status: COMPLETED
Start: 2019-02-15 | End: 2019-02-15

## 2019-02-15 RX ORDER — HYDROXYZINE PAMOATE 50 MG/1
50 CAPSULE ORAL 3 TIMES DAILY PRN
COMMUNITY

## 2019-02-15 RX ORDER — SODIUM CHLORIDE 0.9 % (FLUSH) 0.9 %
10 SYRINGE (ML) INJECTION AS NEEDED
Status: DISCONTINUED | OUTPATIENT
Start: 2019-02-15 | End: 2019-02-16 | Stop reason: HOSPADM

## 2019-02-15 RX ORDER — ONDANSETRON 4 MG/1
4 TABLET, ORALLY DISINTEGRATING ORAL EVERY 6 HOURS PRN
Status: DISCONTINUED | OUTPATIENT
Start: 2019-02-15 | End: 2019-02-16 | Stop reason: HOSPADM

## 2019-02-15 RX ORDER — ONDANSETRON 4 MG/1
4 TABLET, FILM COATED ORAL EVERY 6 HOURS PRN
Status: DISCONTINUED | OUTPATIENT
Start: 2019-02-15 | End: 2019-02-16 | Stop reason: HOSPADM

## 2019-02-15 RX ORDER — SODIUM CHLORIDE AND POTASSIUM CHLORIDE 150; 900 MG/100ML; MG/100ML
100 INJECTION, SOLUTION INTRAVENOUS CONTINUOUS
Status: DISCONTINUED | OUTPATIENT
Start: 2019-02-15 | End: 2019-02-16

## 2019-02-15 RX ORDER — MELATONIN/PYRIDOXINE HCL (B6) 10 MG-10MG
TABLET,IMMED, EXTENDED RELEASE, BIPHASIC ORAL
COMMUNITY

## 2019-02-15 RX ORDER — SODIUM CHLORIDE 9 MG/ML
40 INJECTION, SOLUTION INTRAVENOUS AS NEEDED
Status: DISCONTINUED | OUTPATIENT
Start: 2019-02-15 | End: 2019-02-16 | Stop reason: HOSPADM

## 2019-02-15 RX ADMIN — POTASSIUM CHLORIDE AND SODIUM CHLORIDE 100 ML/HR: 900; 150 INJECTION, SOLUTION INTRAVENOUS at 20:16

## 2019-02-15 RX ADMIN — LEVOFLOXACIN 500 MG: 500 INJECTION, SOLUTION INTRAVENOUS at 22:28

## 2019-02-15 RX ADMIN — SODIUM CHLORIDE AND POTASSIUM CHLORIDE 100 ML/HR: 150; 900 INJECTION, SOLUTION INTRAVENOUS at 20:16

## 2019-02-15 RX ADMIN — MELATONIN TAB 5 MG 10 MG: 5 TAB at 22:27

## 2019-02-15 RX ADMIN — SODIUM CHLORIDE 1000 ML: 900 INJECTION, SOLUTION INTRAVENOUS at 15:42

## 2019-02-15 RX ADMIN — LEVETIRACETAM 500 MG: 500 INJECTION, SOLUTION, CONCENTRATE INTRAVENOUS at 23:37
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
## 2019-02-16 ENCOUNTER — APPOINTMENT (OUTPATIENT)
Dept: GENERAL RADIOLOGY | Facility: HOSPITAL | Age: 59
End: 2019-02-16

## 2019-02-16 VITALS
WEIGHT: 171.1 LBS | OXYGEN SATURATION: 96 % | DIASTOLIC BLOOD PRESSURE: 59 MMHG | HEIGHT: 66 IN | TEMPERATURE: 98.6 F | HEART RATE: 59 BPM | RESPIRATION RATE: 16 BRPM | BODY MASS INDEX: 27.5 KG/M2 | SYSTOLIC BLOOD PRESSURE: 92 MMHG

## 2019-02-16 LAB
ANION GAP SERPL CALCULATED.3IONS-SCNC: 7.1 MMOL/L
B PARAPERT DNA SPEC QL NAA+PROBE: NOT DETECTED
B PERT DNA SPEC QL NAA+PROBE: NOT DETECTED
BASOPHILS # BLD AUTO: 0.02 10*3/MM3 (ref 0–0.2)
BASOPHILS NFR BLD AUTO: 0.3 % (ref 0–1.5)
BUN BLD-MCNC: 8 MG/DL (ref 6–20)
BUN/CREAT SERPL: 12.1 (ref 7–25)
C PNEUM DNA NPH QL NAA+NON-PROBE: NOT DETECTED
CALCIUM SPEC-SCNC: 8.6 MG/DL (ref 8.6–10.5)
CHLORIDE SERPL-SCNC: 112 MMOL/L (ref 98–107)
CO2 SERPL-SCNC: 22.9 MMOL/L (ref 22–29)
CREAT BLD-MCNC: 0.66 MG/DL (ref 0.57–1)
DEPRECATED RDW RBC AUTO: 52.8 FL (ref 37–54)
EOSINOPHIL # BLD AUTO: 0.19 10*3/MM3 (ref 0–0.4)
EOSINOPHIL NFR BLD AUTO: 2.5 % (ref 0.3–6.2)
ERYTHROCYTE [DISTWIDTH] IN BLOOD BY AUTOMATED COUNT: 15.3 % (ref 12.3–15.4)
FLUAV H1 2009 PAND RNA NPH QL NAA+PROBE: NOT DETECTED
FLUAV H1 HA GENE NPH QL NAA+PROBE: NOT DETECTED
FLUAV H3 RNA NPH QL NAA+PROBE: NOT DETECTED
FLUAV SUBTYP SPEC NAA+PROBE: NOT DETECTED
FLUBV RNA ISLT QL NAA+PROBE: NOT DETECTED
GFR SERPL CREATININE-BSD FRML MDRD: 93 ML/MIN/1.73
GLUCOSE BLD-MCNC: 88 MG/DL (ref 65–99)
HADV DNA SPEC NAA+PROBE: NOT DETECTED
HCOV 229E RNA SPEC QL NAA+PROBE: NOT DETECTED
HCOV HKU1 RNA SPEC QL NAA+PROBE: NOT DETECTED
HCOV NL63 RNA SPEC QL NAA+PROBE: NOT DETECTED
HCOV OC43 RNA SPEC QL NAA+PROBE: NOT DETECTED
HCT VFR BLD AUTO: 39.4 % (ref 34–46.6)
HGB BLD-MCNC: 13.2 G/DL (ref 12–15.9)
HMPV RNA NPH QL NAA+NON-PROBE: NOT DETECTED
HPIV1 RNA SPEC QL NAA+PROBE: NOT DETECTED
HPIV2 RNA SPEC QL NAA+PROBE: NOT DETECTED
HPIV3 RNA NPH QL NAA+PROBE: NOT DETECTED
HPIV4 P GENE NPH QL NAA+PROBE: NOT DETECTED
IMM GRANULOCYTES # BLD AUTO: 0.01 10*3/MM3 (ref 0–0.05)
IMM GRANULOCYTES NFR BLD AUTO: 0.1 % (ref 0–0.5)
LYMPHOCYTES # BLD AUTO: 3.04 10*3/MM3 (ref 0.7–3.1)
LYMPHOCYTES NFR BLD AUTO: 40.6 % (ref 19.6–45.3)
M PNEUMO IGG SER IA-ACNC: NOT DETECTED
MCH RBC QN AUTO: 31.4 PG (ref 26.6–33)
MCHC RBC AUTO-ENTMCNC: 33.5 G/DL (ref 31.5–35.7)
MCV RBC AUTO: 93.6 FL (ref 79–97)
MONOCYTES # BLD AUTO: 0.68 10*3/MM3 (ref 0.1–0.9)
MONOCYTES NFR BLD AUTO: 9.1 % (ref 5–12)
NEUTROPHILS # BLD AUTO: 3.55 10*3/MM3 (ref 1.4–7)
NEUTROPHILS NFR BLD AUTO: 47.4 % (ref 42.7–76)
NRBC BLD AUTO-RTO: 0 /100 WBC (ref 0–0)
PLATELET # BLD AUTO: 204 10*3/MM3 (ref 140–450)
PMV BLD AUTO: 11 FL (ref 6–12)
POTASSIUM BLD-SCNC: 3.9 MMOL/L (ref 3.5–5.2)
PROCALCITONIN SERPL-MCNC: 0.03 NG/ML (ref 0.1–0.25)
PROCALCITONIN SERPL-MCNC: 0.03 NG/ML (ref 0.1–0.25)
RBC # BLD AUTO: 4.21 10*6/MM3 (ref 3.77–5.28)
RHINOVIRUS RNA SPEC NAA+PROBE: NOT DETECTED
RSV RNA NPH QL NAA+NON-PROBE: NOT DETECTED
SODIUM BLD-SCNC: 142 MMOL/L (ref 136–145)
WBC NRBC COR # BLD: 7.49 10*3/MM3 (ref 3.4–10.8)

## 2019-02-16 PROCEDURE — 85025 COMPLETE CBC W/AUTO DIFF WBC: CPT | Performed by: INTERNAL MEDICINE

## 2019-02-16 PROCEDURE — 71045 X-RAY EXAM CHEST 1 VIEW: CPT

## 2019-02-16 PROCEDURE — 84145 PROCALCITONIN (PCT): CPT | Performed by: HOSPITALIST

## 2019-02-16 PROCEDURE — 25810000003 SODIUM CHLORIDE 0.9 % WITH KCL 20 MEQ 20-0.9 MEQ/L-% SOLUTION: Performed by: HOSPITALIST

## 2019-02-16 PROCEDURE — 87486 CHLMYD PNEUM DNA AMP PROBE: CPT | Performed by: INTERNAL MEDICINE

## 2019-02-16 PROCEDURE — 87631 RESP VIRUS 3-5 TARGETS: CPT | Performed by: INTERNAL MEDICINE

## 2019-02-16 PROCEDURE — 87581 M.PNEUMON DNA AMP PROBE: CPT | Performed by: INTERNAL MEDICINE

## 2019-02-16 PROCEDURE — 99217 PR OBSERVATION CARE DISCHARGE MANAGEMENT: CPT | Performed by: HOSPITALIST

## 2019-02-16 PROCEDURE — 25010000002 LEVETIRACETAM IN NACL 0.82% 500 MG/100ML SOLUTION: Performed by: INTERNAL MEDICINE

## 2019-02-16 PROCEDURE — 80048 BASIC METABOLIC PNL TOTAL CA: CPT | Performed by: INTERNAL MEDICINE

## 2019-02-16 PROCEDURE — G0378 HOSPITAL OBSERVATION PER HR: HCPCS

## 2019-02-16 PROCEDURE — 96361 HYDRATE IV INFUSION ADD-ON: CPT

## 2019-02-16 PROCEDURE — 87798 DETECT AGENT NOS DNA AMP: CPT | Performed by: INTERNAL MEDICINE

## 2019-02-16 PROCEDURE — 96375 TX/PRO/DX INJ NEW DRUG ADDON: CPT

## 2019-02-16 RX ORDER — SODIUM CHLORIDE AND POTASSIUM CHLORIDE 150; 900 MG/100ML; MG/100ML
125 INJECTION, SOLUTION INTRAVENOUS CONTINUOUS
Status: DISCONTINUED | OUTPATIENT
Start: 2019-02-16 | End: 2019-02-16

## 2019-02-16 RX ORDER — SODIUM CHLORIDE, SODIUM LACTATE, POTASSIUM CHLORIDE, CALCIUM CHLORIDE 600; 310; 30; 20 MG/100ML; MG/100ML; MG/100ML; MG/100ML
125 INJECTION, SOLUTION INTRAVENOUS CONTINUOUS
Status: DISCONTINUED | OUTPATIENT
Start: 2019-02-16 | End: 2019-02-16

## 2019-02-16 RX ORDER — LEVOFLOXACIN 5 MG/ML
750 INJECTION, SOLUTION INTRAVENOUS EVERY 24 HOURS
Status: DISCONTINUED | OUTPATIENT
Start: 2019-02-16 | End: 2019-02-16 | Stop reason: HOSPADM

## 2019-02-16 RX ORDER — LEVETIRACETAM 750 MG/1
1500 TABLET ORAL 2 TIMES DAILY WITH MEALS
Qty: 40 TABLET | Refills: 0 | Status: SHIPPED | OUTPATIENT
Start: 2019-02-16

## 2019-02-16 RX ORDER — LEVETIRACETAM 500 MG/1
1500 TABLET ORAL 2 TIMES DAILY WITH MEALS
Status: DISCONTINUED | OUTPATIENT
Start: 2019-02-16 | End: 2019-02-16 | Stop reason: HOSPADM

## 2019-02-16 RX ADMIN — FAMOTIDINE 20 MG: 10 INJECTION, SOLUTION INTRAVENOUS at 01:31

## 2019-02-16 RX ADMIN — FAMOTIDINE 20 MG: 10 INJECTION, SOLUTION INTRAVENOUS at 09:21

## 2019-02-16 RX ADMIN — LEVETIRACETAM 500 MG: 5 INJECTION, SOLUTION INTRAVENOUS at 09:21

## 2019-02-16 RX ADMIN — SODIUM CHLORIDE AND POTASSIUM CHLORIDE 125 ML/HR: 9; 1.49 INJECTION, SOLUTION INTRAVENOUS at 12:13

## 2019-02-16 RX ADMIN — SODIUM CHLORIDE, POTASSIUM CHLORIDE, SODIUM LACTATE AND CALCIUM CHLORIDE 1000 ML: 600; 310; 30; 20 INJECTION, SOLUTION INTRAVENOUS at 00:22

## 2019-02-16 RX ADMIN — SODIUM CHLORIDE, POTASSIUM CHLORIDE, SODIUM LACTATE AND CALCIUM CHLORIDE 125 ML/HR: 600; 310; 30; 20 INJECTION, SOLUTION INTRAVENOUS at 01:27

## 2019-02-16 NOTE — NURSING NOTE
Continued Stay Note  YISEL Arechiga     Patient Name: Geeta Cristobal  MRN: [id number]  Today's Date: 2/16/2019    Admit Date: 2/15/2019    Discharge Plan     Row Name 02/16/19 0941       Plan    Plan  Uncertain    Patient/Family in Agreement with Plan  unable to assess    Plan Comments  Unable to interview patient as she is lethargic and minimally responsive.  Will await arrival of family.         Discharge Codes    No documentation.             Lana Mancilla RN

## 2019-02-16 NOTE — PROGRESS NOTES
Called by nursing about pt with blood pressure confirmed by manual to be 70's/40's. Pt is lethargic and altered, but is waking up and talking.     Pt is only complaining of non-specific abdominal pain. Having 'a little bit' of diarrhea. Though chart review shows pt was seen last month by Dr. Erazo for abdominal pain, EGD was performed and found medium sized hiatal hernia and gastritis.     On PE: NAD, though acutely ill appearing, eyes open to voice, slow simple speech, moves all 4 extremities  Cap refill 3 seconds    Will work up for sepsis, despite being bradycardic, and afebrile. Given AMS and Diastolic Hypotension have been shown to be more sensitive and specific for detection of sepsis.     Checking procal on blood in lab. Starting empiric rocephin  Checking resp viral panel, GI panel if diarrhea is seen, and cxr. U/A neg  Giving fluid bolus of 1L LR.  Switching infusion to LR from NS.  Will monitor.     Abdominal pain with h/o Gastritis  - giving famotidine

## 2019-02-16 NOTE — PLAN OF CARE
Problem: Patient Care Overview  Goal: Discharge Needs Assessment  Outcome: Ongoing (interventions implemented as appropriate)   02/15/19 1734 02/16/19 0200 02/16/19 1247   Disability   Equipment Currently Used at Home --  none --    Discharge Needs Assessment   Patient/Family Anticipates Transition to --  --  home   Patient/Family Anticipated Services at Transition none --  --    Transportation Anticipated --  --  family or friend will provide
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
## 2019-02-16 NOTE — DISCHARGE SUMMARY
"Geeta Cristobal  1960  [id number]    Hospitalists Discharge Summary    Date of Admission: 2/15/2019  Date of Discharge:  2/16/2019      Primary Discharge Diagnoses:    1.  Encephaloapthy  2.  Polypharmacy    Secondary Discharge Diagnoses:    1.  Seizure Disorder  2.  Microscopic Colitis  3.  Anxiety/PTSD  4.  Tobacco abuse    History of Present Illness (taken from H&P):  Ms. Cristobal is a 57 y/o  female who was noted to have a \"tonic-clonic\" seizure earlier today that was witnessed by home health nurse.  Her brother accompanied her to the ER, but he has since returned home.  No other family at bedside and the patient is still lethargic and can only provide some history.  She reports she is compliant with her medical regimen, and nothing has been changed.  She follows with [facility] and was seen on 12/20 (Care Everywhere).  Those notes also indicate she does not use illegal drugs.  She cannot remember when her last seizure was (althoug according to her notes she has \"transient alteration of awareness\").  It is not clear to me why someone so young is seen by home health.  She has been followed earlier this year by Dr. Welsh who notes she has microscopic colitis by biopsy from recent EGD/Colonoscopy.  She notes her last cigarette was this morning.  She has continued abdominal pain, but does not describe nausea.  No diarrheal episodes today.  IN the ER, it took longer than 2-hours for her to begin to communicate with staff which they felt was outside of a post-ictal state.  No loss of urine is reported by staff.    Hospital Course Summary:  Ms. rCistobal was admitted to the Med/Surg unit under seizure precautions.  The ER provider felt her current state was not due to post-ictal given the length of time that had passed, so I held all of her psychotropic medications to see if this was a medication issue.  She did have an isolated hypotensive episode overnight that responded to a one time bolus.  Levaquin was " initiated, but further infectious work-up was negative.  I did notice her trend to be low goal, and I did not see any hypertensive episodes when I reviewed her clinic notes from [facility].  No further seizure activity was noted, and by lunch time, she was awake and at her baseline per her brother and JOSEPH.  I expressed my concern that her medications should be streamlined as it seemed odd to have her on stimulants in the morning and depressants at night...especially using stimulants with her history of seizures.  I could not retrieve her Sourav, but I did verify her medications via a clinic note.  Dr. Hua stopped her Topamax and increased her Keppra.  I asked the patient and family to have early follow-up with her [facility] provider to ensure she was also OK with this medication change as they follow her routinely.  She should continue / home health.      PCP  Patient Care Team:  Catrina Villaseñor MD as PCP - General  Lv Arboleda MD as Consulting Physician (Hematology and Oncology)  Derrick Macdonald MD as Referring Physician (Dermatology)    Consults:   Consults     Date and Time Order Name Status Description    2/15/2019 1935 Inpatient Neurology Consult General Completed           Operations and Procedures Performed:       Ct Head Without Contrast    Result Date: 2/15/2019  Narrative: HEAD CT WITHOUT CONTRAST 2/15/2019  HISTORY: Seizure today and yesterday.  TECHNIQUE: Multiple axial images were obtained from the skull base to vertex without intravenous contrast administration. Radiation dose reduction techniques included automated exposure control or exposure modulation based on body size. Radiation audit for CT and nuclear cardiology exams in the last 12 months: 2.  FINDINGS: The ventricles are normal in size shape and position. There is no midline shift. There is no mass or mass effect, hemorrhage or acute infarct. Tiny old lacunar infarct involving the right anterior limb of the internal capsule.  Visualized paranasal sinuses are clear.      Impression: No acute intracranial abnormality.  This report was finalized on 2/15/2019 3:18 PM by Dr. Jose M Perea MD.      Xr Chest 1 View    Result Date: 2/16/2019  Narrative: CHEST X-RAY, 2/16/2019     HISTORY: 58-year-old female with seizure disorder admitted to the hospital today after a seizure. Encephalopathy.  TECHNIQUE: AP portable upright chest x-ray.  FINDINGS: Heart size and pulmonary vascularity are within normal limits. The lungs are expanded and clear.  Vagus nerve stimulator in place. Left mastectomy. No change since 11/20/2017.      Impression: 1. No active disease. 2. Vagus nerve stimulator. Left mastectomy. 3. No change since 11/20/2017.  This report was finalized on 2/16/2019 7:04 AM by Dr. David Mauro MD.        Allergies:  is allergic to aleve [naproxen sodium]; amoxicillin; aspirin; clarithromycin; doxycycline hyclate; ibuprofen; penicillins; salicylates; and sulfa antibiotics.    Sourav  not reviewed    Discharge Medications:     Discharge Medications      Changes to Medications      Instructions Start Date   levETIRAcetam 750 MG tablet  Commonly known as:  KEPPRA  What changed:    · medication strength  · how much to take  · when to take this   1,500 mg, Oral, 2 Times Daily With Meals         Continue These Medications      Instructions Start Date   acetaminophen 500 MG tablet  Commonly known as:  TYLENOL   500 mg, Oral, Every 6 Hours PRN      AMPHETAMINE SALT COMBO PO   10 mg, Oral, 2 Times Daily      atorvastatin 40 MG tablet  Commonly known as:  LIPITOR   40 mg, Oral      cholestyramine 4 GM/DOSE powder  Commonly known as:  QUESTRAN   4 g, Oral, 2 Times Daily With Meals, mixed with a liquid      clonazePAM 2 MG tablet  Commonly known as:  KlonoPIN   3 mg, Oral, Nightly      D3 ADULT PO   2,000 Units, Oral, Daily      folic acid 1 MG tablet  Commonly known as:  FOLVITE   1 mg, Oral      haloperidol 5 MG tablet  Commonly known as:   HALDOL   take 1 tablet by mouth at bedtime      hydrocortisone 2.5 % rectal cream  Commonly known as:  ANUSOL-HC   Rectal, 2 Times Daily PRN      hydrOXYzine pamoate 50 MG capsule  Commonly known as:  VISTARIL   50 mg, Oral, 3 Times Daily PRN      Melatonin 10-10 MG tablet controlled-release   Oral      MULTI-VITAMIN tablet   1 tablet, Oral      omeprazole 20 MG capsule  Commonly known as:  priLOSEC   take 1 capsule by mouth once daily      venlafaxine  MG 24 hr capsule  Commonly known as:  EFFEXOR-XR   150 mg, Oral, Daily         Stop These Medications    bismuth subsalicylate 262 MG chewable tablet  Commonly known as:  PEPTO-BISMOL     topiramate 50 MG tablet  Commonly known as:  TOPAMAX            Last Lab Results:   Lab Results (most recent)     Procedure Component Value Units Date/Time    Procalcitonin [[id number]]  (Abnormal) Collected:  02/16/19 0457    Specimen:  Blood Updated:  02/16/19 0958     Procalcitonin 0.03 ng/mL     Narrative:       As a Marker for Sepsis (Non-Neonates):   1. <0.5 ng/mL represents a low risk of severe sepsis and/or septic shock.  2. >2 ng/mL represents a high risk of severe sepsis and/or septic shock.    As a Marker for Lower Respiratory Tract Infections that require antibiotic therapy:    PCT on Admission     Antibiotic Therapy       6-12 Hrs later  > 0.5                Strongly Recommended             >0.25 - <0.5         Recommended  0.1 - 0.25           Discouraged              Remeasure/reassess PCT  <0.1                 Strongly Discouraged     Remeasure/reassess PCT                     PCT values of < 0.5 ng/mL do not exclude an infection, because localized infections (without systemic signs) may be associated with such low concentrations, or a systemic infection in its initial stages (< 6 hours). Furthermore, increased PCT can occur without infection. PCT concentrations between 0.5 and 2.0 ng/mL should be interpreted taking into account the patient's history. It is  recommended to retest PCT within 6-24 hours if any concentrations < 2 ng/mL are obtained.    Respiratory Panel, PCR - Swab, Nasopharynx [[id number]]  (Normal) Collected:  02/16/19 0026    Specimen:  Swab from Nasopharynx Updated:  02/16/19 0944     ADENOVIRUS, PCR Not Detected     Coronavirus 229E Not Detected     Coronavirus HKU1 Not Detected     Coronavirus NL63 Not Detected     Coronavirus OC43 Not Detected     Human Metapneumovirus Not Detected     Human Rhinovirus/Enterovirus Not Detected     Influenza B PCR Not Detected     Parainfluenza Virus 1 Not Detected     Parainfluenza Virus 2 Not Detected     Parainfluenza Virus 3 Not Detected     Parainfluenza Virus 4 Not Detected     Bordetella pertussis pcr Not Detected     Influenza A H1 2009 PCR Not Detected     Chlamydophila pneumoniae PCR Not Detected     Mycoplasma pneumo by PCR Not Detected     Influenza A PCR Not Detected     Influenza A H3 Not Detected     Influenza A H1 Not Detected     RSV, PCR Not Detected     Bordetella parapertussis PCR Not Detected    Basic Metabolic Panel [[id number]]  (Abnormal) Collected:  02/16/19 0457    Specimen:  Blood Updated:  02/16/19 0607     Glucose 88 mg/dL      BUN 8 mg/dL      Creatinine 0.66 mg/dL      Sodium 142 mmol/L      Potassium 3.9 mmol/L      Chloride 112 mmol/L      CO2 22.9 mmol/L      Calcium 8.6 mg/dL      eGFR Non African Amer 93 mL/min/1.73      BUN/Creatinine Ratio 12.1     Anion Gap 7.1 mmol/L     Narrative:       GFR Normal >60  Chronic Kidney Disease <60  Kidney Failure <15    CBC Auto Differential [[id number]]  (Normal) Collected:  02/16/19 0457    Specimen:  Blood Updated:  02/16/19 0545     WBC 7.49 10*3/mm3      RBC 4.21 10*6/mm3      Hemoglobin 13.2 g/dL      Hematocrit 39.4 %      MCV 93.6 fL      MCH 31.4 pg      MCHC 33.5 g/dL      RDW 15.3 %      RDW-SD 52.8 fl      MPV 11.0 fL      Platelets 204 10*3/mm3      Neutrophil % 47.4 %      Lymphocyte % 40.6 %      Monocyte % 9.1 %      Eosinophil %  2.5 %      Basophil % 0.3 %      Immature Grans % 0.1 %      Neutrophils, Absolute 3.55 10*3/mm3      Lymphocytes, Absolute 3.04 10*3/mm3      Monocytes, Absolute 0.68 10*3/mm3      Eosinophils, Absolute 0.19 10*3/mm3      Basophils, Absolute 0.02 10*3/mm3      Immature Grans, Absolute 0.01 10*3/mm3      nRBC 0.0 /100 WBC     Procalcitonin [[id number]]  (Abnormal) Collected:  02/15/19 1426    Specimen:  Blood Updated:  02/16/19 0048     Procalcitonin 0.03 ng/mL     Narrative:       As a Marker for Sepsis (Non-Neonates):   1. <0.5 ng/mL represents a low risk of severe sepsis and/or septic shock.  2. >2 ng/mL represents a high risk of severe sepsis and/or septic shock.    As a Marker for Lower Respiratory Tract Infections that require antibiotic therapy:    PCT on Admission     Antibiotic Therapy       6-12 Hrs later  > 0.5                Strongly Recommended             >0.25 - <0.5         Recommended  0.1 - 0.25           Discouraged              Remeasure/reassess PCT  <0.1                 Strongly Discouraged     Remeasure/reassess PCT                     PCT values of < 0.5 ng/mL do not exclude an infection, because localized infections (without systemic signs) may be associated with such low concentrations, or a systemic infection in its initial stages (< 6 hours). Furthermore, increased PCT can occur without infection. PCT concentrations between 0.5 and 2.0 ng/mL should be interpreted taking into account the patient's history. It is recommended to retest PCT within 6-24 hours if any concentrations < 2 ng/mL are obtained.    Urine Drug Screen - Urine, Catheter [[id number]]  (Normal) Collected:  02/15/19 2033    Specimen:  Urine, Catheter Updated:  02/15/19 2051     THC, Screen, Urine Negative     Phencyclidine (PCP), Urine Negative     Cocaine Screen, Urine Negative     Methamphetamine Negative     Opiate Screen Negative     Amphetamine Screen, Urine Negative     Benzodiazepine Screen, Urine Negative      Tricyclic Antidepressants Screen Negative     Methadone Screen, Urine Negative     Barbiturates Screen, Urine Negative     Oxycodone Screen, Urine Negative     Propoxyphene Screen Negative     Buprenorphine, Screen, Urine Negative    Narrative:       Urine drug screen results are to be used for medical purposes only.  They are not to be used for legal purposes such as employment testing.  Negative results do not necessarily mean the complete absence of a subtance, but rather that the result is less than the cutoff for that substance.  Positive results are unconfirmed and considered Preliminary Positive.  [facility] does not automatically confirm Postitive Unconfirmed results.  The physician may request (order) an Unconfirmed Positive result to be sent out for confirmation.      Negative Thresholds for Drugs Screened:    THC screen, urine                          50 ng/ml  Phenycyclidine (PCP), urine                25 ng/ml  Cocaine screen, urine                     150 ng/ml  Methamphetamine, urine                    500 ng/ml  Opiate screen, urine                      100 ng/ml  Amphetamine screen, urine                 500 ng/ml  Benzodiazepine screen, urine              150 ng/ml  Tricyclic Antidepressants screen, urine   300 ng/ml  Methadone screen, urine                   200 ng/ml  Barbiturates screen, urine                200 ng/ml  Oxycodone screen, urine                   100 ng/ml  Propoxyphene screen, urine                300 ng/ml  Buprenorphine screen, urine                10 ng/ml    TSH [[id number]]  (Normal) Collected:  02/15/19 1426    Specimen:  Blood Updated:  02/15/19 2001     TSH 0.763 mIU/mL     Magnesium [[id number]]  (Normal) Collected:  02/15/19 1426    Specimen:  Blood Updated:  02/15/19 1934     Magnesium 2.2 mg/dL     Levetiracetam Level (Keppra) [[id number]] Collected:  02/15/19 1825    Specimen:  Blood Updated:  02/15/19 1825    Blood Gas, Arterial [[id number]]  (Abnormal)  Collected:  02/15/19 1608    Specimen:  Arterial Blood Updated:  02/15/19 1612     Site Right Radial     Smith's Test N/A     pH, Arterial 7.360 pH units      pCO2, Arterial 43.8 mm Hg      pO2, Arterial 75.3 mm Hg      Comment: 84 Value below reference range        HCO3, Arterial 24.8 mmol/L      Base Excess, Arterial -0.9 mmol/L      Comment: 84 Value below reference range        O2 Saturation, Arterial 96.8 %      Hemoglobin, Blood Gas 13.7 g/dL      Temperature 37.0 C      Barometric Pressure for Blood Gas 736 mmHg      Modality Room Air     Ventilator Mode NA     Comment: Meter: [id number]     :  [id number]        pCO2, Temperature Corrected 43.8 mm Hg      pH, Temp Corrected 7.360 pH Units      pO2, Temperature Corrected 75.3 mm Hg     Comprehensive Metabolic Panel [[id number]] Collected:  02/15/19 1426    Specimen:  Blood Updated:  02/15/19 1450     Glucose 84 mg/dL      BUN 8 mg/dL      Creatinine 0.71 mg/dL      Sodium 141 mmol/L      Potassium 4.4 mmol/L      Chloride 106 mmol/L      CO2 26.3 mmol/L      Calcium 9.1 mg/dL      Total Protein 6.8 g/dL      Albumin 3.70 g/dL      ALT (SGPT) 15 U/L      AST (SGOT) 17 U/L      Alkaline Phosphatase 125 U/L      Total Bilirubin 0.2 mg/dL      eGFR Non African Amer 85 mL/min/1.73      Globulin 3.1 gm/dL      A/G Ratio 1.2 g/dL      BUN/Creatinine Ratio 11.3     Anion Gap 8.7 mmol/L     CBC & Differential [[id number]] Collected:  02/15/19 1426    Specimen:  Blood Updated:  02/15/19 1434    Narrative:       The following orders were created for panel order CBC & Differential.  Procedure                               Abnormality         Status                     ---------                               -----------         ------                     CBC Auto Differential[[id number]]        Normal              Final result                 Please view results for these tests on the individual orders.    CBC Auto Differential [[id number]]  (Normal) Collected:   02/15/19 1426    Specimen:  Blood Updated:  02/15/19 1434     WBC 7.33 10*3/mm3      RBC 4.60 10*6/mm3      Hemoglobin 14.7 g/dL      Hematocrit 42.9 %      MCV 93.3 fL      MCH 32.0 pg      MCHC 34.3 g/dL      RDW 14.9 %      RDW-SD 52.3 fl      MPV 10.4 fL      Platelets 256 10*3/mm3      Neutrophil % 51.3 %      Lymphocyte % 38.2 %      Monocyte % 7.8 %      Eosinophil % 2.2 %      Basophil % 0.4 %      Immature Grans % 0.1 %      Neutrophils, Absolute 3.76 10*3/mm3      Lymphocytes, Absolute 2.80 10*3/mm3      Monocytes, Absolute 0.57 10*3/mm3      Eosinophils, Absolute 0.16 10*3/mm3      Basophils, Absolute 0.03 10*3/mm3      Immature Grans, Absolute 0.01 10*3/mm3     POC Glucose Once [[id number]]  (Normal) Collected:  02/15/19 1423    Specimen:  Blood Updated:  02/15/19 1429     Glucose 81 mg/dL         Imaging Results (most recent)     Procedure Component Value Units Date/Time    XR Chest 1 View [[id number]] Collected:  02/16/19 0659     Updated:  02/16/19 0706    Narrative:       CHEST X-RAY, 2/16/2019         HISTORY:  58-year-old female with seizure disorder admitted to the hospital today  after a seizure. Encephalopathy.     TECHNIQUE:  AP portable upright chest x-ray.     FINDINGS:  Heart size and pulmonary vascularity are within normal limits. The lungs  are expanded and clear.     Vagus nerve stimulator in place. Left mastectomy. No change since  11/20/2017.       Impression:       1. No active disease.  2. Vagus nerve stimulator. Left mastectomy.  3. No change since 11/20/2017.     This report was finalized on 2/16/2019 7:04 AM by Dr. David Mauro MD.       CT Head Without Contrast [[id number]] Collected:  02/15/19 1515     Updated:  02/15/19 1520    Narrative:       HEAD CT WITHOUT CONTRAST 2/15/2019     HISTORY:  Seizure today and yesterday.     TECHNIQUE:  Multiple axial images were obtained from the skull base to vertex  without intravenous contrast administration. Radiation dose  reduction  techniques included automated exposure control or exposure modulation  based on body size. Radiation audit for CT and nuclear cardiology exams  in the last 12 months: 2.     FINDINGS:  The ventricles are normal in size shape and position. There is no  midline shift. There is no mass or mass effect, hemorrhage or acute  infarct. Tiny old lacunar infarct involving the right anterior limb of  the internal capsule. Visualized paranasal sinuses are clear.       Impression:       No acute intracranial abnormality.     This report was finalized on 2/15/2019 3:18 PM by Dr. Jose M Perea MD.             PROCEDURES      Condition on Discharge:  Stable    Physical Exam at Discharge  Vital Signs  Temp:  [97.7 °F (36.5 °C)-98.7 °F (37.1 °C)] 98.6 °F (37 °C)  Heart Rate:  [50-66] 59  Resp:  [12-16] 16  BP: ()/(40-73) 92/59    Physical Exam:  Physical Exam   Constitutional: Patient appears much older than stated age   HEENT:   Eyes:  Pupils are equal, round, and reactive to light. EOM are intact. Sclera are anicteric and non-injected.  Cardiovascular: Regular rate, regular rhythm, S1 normal and S2 normal.  Exam reveals no gallop and no friction rub.  No murmur heard.  Pulmonary/Chest: Lungs are clear to auscultation bilaterally. No respiratory distress. No wheezes. No rhonchi. No rales.   Abdominal: Soft. Bowel sounds are normal. There is no tenderness.   Extremities: No edema. Pulses are palpable in all 4 extremities.    Discharge Disposition  Home w/ family    Visiting Nurse:    Yes     Home PT/OT:  No     Home Safety Evaluation:  No     DME  None    Discharge Diet:      Dietary Orders (From admission, onward)    Start     Ordered    02/16/19 1151  Diet Regular; Cardiac  Diet Effective Now     Question Answer Comment   Diet Texture / Consistency Regular    Common Modifiers Cardiac        02/16/19 1151          Activity at Discharge:  As tolerated      Follow-up Appointments  No future appointments.  Additional  Instructions for the Follow-ups that You Need to Schedule     Discharge Follow-up with PCP   As directed       Currently Documented PCP:    Catrina Villaseñor MD    PCP Phone Number:    [phone]     Follow Up Details:  1 week         Discharge Follow-up with Specified Provider: Neurologist; 3 Days   As directed      To:  Neurologist    Follow Up:  3 Days    Follow Up Details:  Re-eval seizure regimen               Test Results Pending at Discharge   Order Current Status    Levetiracetam Level (Keppra) In process           Donnie Vegas MD  02/16/19  1:17 PM

## 2019-02-16 NOTE — NURSING NOTE
Discharge Planning Assessment  YISEL Arechiga     Patient Name: Geeta Cristobal  MRN: [id number]  Today's Date: 2/16/2019    Admit Date: 2/15/2019    Discharge Needs Assessment     Row Name 02/16/19 1247       Living Environment    Lives With  alone    Current Living Arrangements  home/apartment/condo    Primary Care Provided by  self    Provides Primary Care For  no one, unable/limited ability to care for self    Family Caregiver if Needed  sibling(s)    Quality of Family Relationships  helpful    Able to Return to Prior Arrangements  yes       Resource/Environmental Concerns    Resource/Environmental Concerns  none       Transition Planning    Patient/Family Anticipates Transition to  home    Transportation Anticipated  family or friend will provide        Discharge Plan     Row Name 02/16/19 1247       Plan    Plan  Home when stable    Patient/Family in Agreement with Plan  yes    Plan Comments  Mrs Cristobal has awakened and her brother is also in the room.  I spoke with them both (with permission).  She lives alone in an apartment in [place].  I updated her pharmacy to [facility].  She does not have home health, DME or oxygen.  She is independent with her ADL's and her brother does all of the driving.  She does not have an advanced directive.  Plan is home when stable.  Will continue to follow        Destination      No service coordination in this encounter.      Durable Medical Equipment      No service coordination in this encounter.      Dialysis/Infusion      No service coordination in this encounter.      Home Medical Care      No service coordination in this encounter.      Community Resources      No service coordination in this encounter.          Demographic Summary     Row Name 02/16/19 1246       General Information    Admission Type  observation    Arrived From  home    Required Notices Provided  Observation Status Notice    Referral Source  admission list    Reason for Consult  discharge planning     Preferred Language  English       Contact Information    Permission Granted to Share Info With          Functional Status    No documentation.       Psychosocial    No documentation.       Abuse/Neglect    No documentation.       Legal    No documentation.       Substance Abuse    No documentation.       Patient Forms    No documentation.           Lana Mancilla RN

## 2019-02-17 ENCOUNTER — READMISSION MANAGEMENT (OUTPATIENT)
Dept: CALL CENTER | Facility: HOSPITAL | Age: 59
End: 2019-02-17

## 2019-02-17 NOTE — OUTREACH NOTE
Prep Survey      Responses   Facility patient discharged from?  [facility]   Is LACE score < 7 ?  Yes   Is patient eligible?  Yes   Discharge diagnosis  Encephalopathy, seizure disorder, tobacco abuse, microscopic colitis anxiety/PTSD   Does the patient have one of the following disease processes/diagnoses(primary or secondary)?  Other   Does the patient have Home health ordered?  No   Is there a DME ordered?  No   Comments regarding appointments  Pt. to schedule follow up with her Neurologist.    Prep survey completed?  Yes          Lana Escobar RN

## 2019-02-18 ENCOUNTER — READMISSION MANAGEMENT (OUTPATIENT)
Dept: CALL CENTER | Facility: HOSPITAL | Age: 59
End: 2019-02-18

## 2019-02-18 LAB — LEVETIRACETAM SERPL-MCNC: 2.8 UG/ML (ref 10–40)

## 2019-02-18 NOTE — OUTREACH NOTE
[facility] < 7 Survey      Responses   Facility patient discharged from?  [place]   Does the patient have one of the following disease processes/diagnoses(primary or secondary)?  Other   Is there a successful TCM telephone encounter documented?  No   [facility] <7 Attempt successful?  Yes   Call start time  1137   Call end time  1139   Discharge diagnosis  Encephalopathy, seizure disorder, tobacco abuse, microscopic colitis anxiety/PTSD   Meds reviewed with patient/caregiver?  Yes   Is the patient having any side effects they believe may be caused by any medication additions or changes?  No   Does the patient have all medications ordered at discharge?  Yes   Is the patient taking all medications as directed (includes completed medication regime)?  Yes   Does the patient have a primary care provider?   Yes   Does the patient have an appointment with their PCP within 7 days of discharge?  Yes   Has the patient kept scheduled appointments due by today?  Yes   Has home health visited the patient within 72 hours of discharge?  N/A   Psychosocial issues?  No   Did the patient receive a copy of their discharge instructions?  Yes   Nursing interventions  Reviewed instructions with patient   What is the patient's perception of their health status since discharge?  Improving   Is the patient/caregiver able to teach back signs and symptoms related to disease process for when to call PCP?  Yes   Is the patient/caregiver able to teach back signs and symptoms related to disease process for when to call 911?  Yes   Is the patient/caregiver able to teach back the hierarchy of who to call/visit for symptoms/problems? PCP, Specialist, Home health nurse, Urgent Care, ED, 911  Yes   Graduated  Yes          Sumit Lopez RN
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
## 2019-03-27 ENCOUNTER — OFFICE VISIT (OUTPATIENT)
Dept: ORTHOPEDIC SURGERY | Facility: CLINIC | Age: 59
End: 2019-03-27

## 2019-03-27 VITALS
HEIGHT: 66 IN | SYSTOLIC BLOOD PRESSURE: 119 MMHG | DIASTOLIC BLOOD PRESSURE: 79 MMHG | HEART RATE: 78 BPM | WEIGHT: 171 LBS | BODY MASS INDEX: 27.48 KG/M2

## 2019-03-27 DIAGNOSIS — R52 PAIN: Primary | ICD-10-CM

## 2019-03-27 DIAGNOSIS — M75.50 SUBACROMIAL BURSITIS: ICD-10-CM

## 2019-03-27 PROCEDURE — 20610 DRAIN/INJ JOINT/BURSA W/O US: CPT | Performed by: ORTHOPAEDIC SURGERY

## 2019-03-27 PROCEDURE — 99203 OFFICE O/P NEW LOW 30 MIN: CPT | Performed by: ORTHOPAEDIC SURGERY

## 2019-03-27 PROCEDURE — 73030 X-RAY EXAM OF SHOULDER: CPT | Performed by: ORTHOPAEDIC SURGERY

## 2019-03-27 RX ORDER — TRIAMCINOLONE ACETONIDE 40 MG/ML
80 INJECTION, SUSPENSION INTRA-ARTICULAR; INTRAMUSCULAR
Status: COMPLETED | OUTPATIENT
Start: 2019-03-27 | End: 2019-03-27

## 2019-03-27 RX ORDER — LIDOCAINE HYDROCHLORIDE 10 MG/ML
4 INJECTION, SOLUTION EPIDURAL; INFILTRATION; INTRACAUDAL; PERINEURAL
Status: COMPLETED | OUTPATIENT
Start: 2019-03-27 | End: 2019-03-27

## 2019-03-27 RX ADMIN — TRIAMCINOLONE ACETONIDE 80 MG: 40 INJECTION, SUSPENSION INTRA-ARTICULAR; INTRAMUSCULAR at 14:20

## 2019-03-27 RX ADMIN — LIDOCAINE HYDROCHLORIDE 4 ML: 10 INJECTION, SOLUTION EPIDURAL; INFILTRATION; INTRACAUDAL; PERINEURAL at 14:20

## 2019-04-02 ENCOUNTER — TELEPHONE (OUTPATIENT)
Dept: FAMILY MEDICINE CLINIC | Facility: CLINIC | Age: 59
End: 2019-04-02

## 2019-04-02 NOTE — TELEPHONE ENCOUNTER
Per doctor pt needs a place to go to her home I gave her the [facility] number and she will contact them.

## 2019-09-26 ENCOUNTER — APPOINTMENT (OUTPATIENT)
Dept: GENERAL RADIOLOGY | Facility: HOSPITAL | Age: 59
End: 2019-09-26

## 2019-09-26 ENCOUNTER — APPOINTMENT (OUTPATIENT)
Dept: CT IMAGING | Facility: HOSPITAL | Age: 59
End: 2019-09-26

## 2019-09-26 ENCOUNTER — HOSPITAL ENCOUNTER (EMERGENCY)
Facility: HOSPITAL | Age: 59
Discharge: HOME | End: 2019-09-26
Attending: EMERGENCY MEDICINE | Admitting: EMERGENCY MEDICINE

## 2019-09-26 VITALS
BODY MASS INDEX: 30.53 KG/M2 | HEIGHT: 66 IN | OXYGEN SATURATION: 98 % | HEART RATE: 87 BPM | RESPIRATION RATE: 16 BRPM | SYSTOLIC BLOOD PRESSURE: 128 MMHG | TEMPERATURE: 99.1 F | WEIGHT: 190 LBS | DIASTOLIC BLOOD PRESSURE: 79 MMHG

## 2019-09-26 DIAGNOSIS — S22.42XA CLOSED FRACTURE OF MULTIPLE RIBS OF LEFT SIDE, INITIAL ENCOUNTER: ICD-10-CM

## 2019-09-26 DIAGNOSIS — G44.319 ACUTE POST-TRAUMATIC HEADACHE, NOT INTRACTABLE: ICD-10-CM

## 2019-09-26 DIAGNOSIS — S13.9XXA NECK SPRAIN, INITIAL ENCOUNTER: ICD-10-CM
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
## 2019-09-26 DIAGNOSIS — S62.664A CLOSED NONDISPLACED FRACTURE OF DISTAL PHALANX OF RIGHT RING FINGER, INITIAL ENCOUNTER: ICD-10-CM

## 2019-09-26 PROCEDURE — 70450 CT HEAD/BRAIN W/O DYE: CPT

## 2019-09-26 PROCEDURE — 71101 X-RAY EXAM UNILAT RIBS/CHEST: CPT

## 2019-09-26 PROCEDURE — 99284 EMERGENCY DEPT VISIT MOD MDM: CPT | Performed by: EMERGENCY MEDICINE

## 2019-09-26 PROCEDURE — 73140 X-RAY EXAM OF FINGER(S): CPT

## 2019-09-26 PROCEDURE — 72125 CT NECK SPINE W/O DYE: CPT

## 2019-09-26 PROCEDURE — 99284 EMERGENCY DEPT VISIT MOD MDM: CPT

## 2019-09-26 RX ORDER — MULTIPLE VITAMINS W/ MINERALS TAB 9MG-400MCG
1 TAB ORAL DAILY
COMMUNITY

## 2019-09-26 RX ORDER — ACETAMINOPHEN 500 MG
1000 TABLET ORAL ONCE
Status: COMPLETED | OUTPATIENT
Start: 2019-09-26 | End: 2019-09-26

## 2019-09-26 RX ORDER — HYDROCODONE BITARTRATE AND ACETAMINOPHEN 5; 325 MG/1; MG/1
1 TABLET ORAL EVERY 4 HOURS PRN
Qty: 20 TABLET | Refills: 0 | Status: SHIPPED | OUTPATIENT
Start: 2019-09-26

## 2019-09-26 RX ORDER — RISPERIDONE 1 MG/1
1 TABLET ORAL 2 TIMES DAILY
COMMUNITY

## 2019-09-26 RX ADMIN — ACETAMINOPHEN 1000 MG: 500 TABLET, FILM COATED ORAL at 17:19
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
## 2019-09-26 NOTE — DISCHARGE INSTRUCTIONS
Cough and deep breathe 2-3 times an hour while you are awake for 2 weeks.  Take Colace as needed as directed for constipation if you are taking the [place].  Follow-up with Jaclyn Cristobal in 1 week for the rib fractures.  [facility] arm and hand clinic at [phone] in the morning to be seen within 1 week for your finger fracture.  Return to the emergency department if there is shortness of breath, worsening pain, fever, chills, cough, numbness, tingling, weakness, change in color or temperature, worse in any way at all.  All the patient's questions were answered the patient will be discharged in good condition.

## 2019-09-26 NOTE — ED NOTES
Pt requesting pain medicine for her headache. Dr Samaniego informed     Maritza Franklin, ARVIN  09/26/19 6305       Maritza Franklin, ARVIN  09/26/19 5163

## 2019-10-21 ENCOUNTER — TRANSCRIBE ORDERS (OUTPATIENT)
Dept: ADMINISTRATIVE | Facility: HOSPITAL | Age: 59
End: 2019-10-21

## 2019-10-21 DIAGNOSIS — S22.20XA BROKEN ARMS, RIB, BREASTBONE: Primary | ICD-10-CM

## 2019-10-21 DIAGNOSIS — S22.39XA BROKEN ARMS, RIB, BREASTBONE: Primary | ICD-10-CM

## 2019-10-21 DIAGNOSIS — S42.301A BROKEN ARMS, RIB, BREASTBONE: Primary | ICD-10-CM

## 2019-10-21 DIAGNOSIS — S42.302A BROKEN ARMS, RIB, BREASTBONE: Primary | ICD-10-CM

## 2019-10-21 DIAGNOSIS — R22.2 MASS IN CHEST: ICD-10-CM

## 2019-10-21 DIAGNOSIS — R07.9 CHEST PAIN, UNSPECIFIED TYPE: ICD-10-CM

## 2019-10-24 ENCOUNTER — HOSPITAL ENCOUNTER (OUTPATIENT)
Dept: CT IMAGING | Facility: HOSPITAL | Age: 59
Discharge: HOME | End: 2019-10-24
Admitting: NURSE PRACTITIONER

## 2019-10-24 DIAGNOSIS — S42.301A BROKEN ARMS, RIB, BREASTBONE: ICD-10-CM

## 2019-10-24 DIAGNOSIS — S22.20XA BROKEN ARMS, RIB, BREASTBONE: ICD-10-CM

## 2019-10-24 DIAGNOSIS — R22.2 MASS IN CHEST: ICD-10-CM

## 2019-10-24 DIAGNOSIS — S42.302A BROKEN ARMS, RIB, BREASTBONE: ICD-10-CM

## 2019-10-24 DIAGNOSIS — R07.9 CHEST PAIN, UNSPECIFIED TYPE: ICD-10-CM

## 2019-10-24 DIAGNOSIS — S22.39XA BROKEN ARMS, RIB, BREASTBONE: ICD-10-CM

## 2019-10-24 PROCEDURE — 71250 CT THORAX DX C-: CPT

## 2020-02-10 ENCOUNTER — TRANSCRIBE ORDERS (OUTPATIENT)
Dept: ADMINISTRATIVE | Facility: HOSPITAL | Age: 60
End: 2020-02-10

## 2020-02-10 DIAGNOSIS — M54.50 LOW BACK PAIN, UNSPECIFIED BACK PAIN LATERALITY, UNSPECIFIED CHRONICITY, UNSPECIFIED WHETHER SCIATICA PRESENT: Primary | ICD-10-CM

## 2020-02-14 ENCOUNTER — HOSPITAL ENCOUNTER (OUTPATIENT)
Dept: MRI IMAGING | Facility: HOSPITAL | Age: 60
End: 2020-02-14

## 2020-04-23 ENCOUNTER — TRANSCRIBE ORDERS (OUTPATIENT)
Dept: ADMINISTRATIVE | Facility: HOSPITAL | Age: 60
End: 2020-04-23

## 2020-04-23 DIAGNOSIS — R07.9 CHEST PAIN, UNSPECIFIED TYPE: Primary | ICD-10-CM

## 2020-04-23 DIAGNOSIS — R94.31 ABNORMAL EKG: ICD-10-CM

## (undated) DEVICE — GLV SURG SENSICARE MICRO PF LF 6 STRL

## (undated) DEVICE — SYR LL 3CC

## (undated) DEVICE — FRCP BX RADJAW4 NDL 2.8 240CM LG OG BX40

## (undated) DEVICE — SNAR POLYP CAPTIFLX WD OVL 27MM 240CM

## (undated) DEVICE — SUCTION CANISTER, 3000CC,SAFELINER: Brand: DEROYAL

## (undated) DEVICE — Device: Brand: DEFENDO AIR/WATER/SUCTION AND BIOPSY VALVE

## (undated) DEVICE — SPNG GZ WOVN 4X4IN 12PLY 10/BX STRL

## (undated) DEVICE — JACKT LAB KNIT COLR LG BLU

## (undated) DEVICE — TRAP POLYP 4CHAMBER

## (undated) DEVICE — PAD GRND E/S W/CORD SPLT A/

## (undated) DEVICE — THE BITE BLOCK MAXI, LATEX FREE STRAP IS USED TO PROTECT THE ENDOSCOPE INSERTION TUBE FROM BEING BITTEN BY THE PATIENT.

## (undated) DEVICE — Device

## (undated) DEVICE — VIAL FORMALIN CAP 10P 40ML

## (undated) DEVICE — BW-412T DISP COMBO CLEANING BRUSH: Brand: SINGLE USE COMBINATION CLEANING BRUSH

## (undated) DEVICE — ENDO. PORT CONNECTOR W/VALVE FOR OLYMPUS® SCOPES: Brand: ERBE

## (undated) DEVICE — GOWN ISOL W/THUMB UNIV BLU BX/15

## (undated) DEVICE — MASK,FACE,FLUID RES,SHLD,FOGFREE,TIES: Brand: MEDLINE

## (undated) DEVICE — MASK,FACE,FLUID RESIST,SHLD,EARLOOP: Brand: MEDLINE